# Patient Record
Sex: FEMALE | Race: WHITE | HISPANIC OR LATINO | Employment: FULL TIME | URBAN - METROPOLITAN AREA
[De-identification: names, ages, dates, MRNs, and addresses within clinical notes are randomized per-mention and may not be internally consistent; named-entity substitution may affect disease eponyms.]

---

## 2020-05-23 ENCOUNTER — TELEPHONE (OUTPATIENT)
Dept: FAMILY MEDICINE CLINIC | Facility: CLINIC | Age: 40
End: 2020-05-23

## 2020-05-23 ENCOUNTER — DOCUMENTATION (OUTPATIENT)
Dept: FAMILY MEDICINE CLINIC | Facility: CLINIC | Age: 40
End: 2020-05-23

## 2020-05-23 ENCOUNTER — TELEPHONE (OUTPATIENT)
Dept: OTHER | Facility: OTHER | Age: 40
End: 2020-05-23

## 2020-05-23 DIAGNOSIS — Z20.822 CLOSE EXPOSURE TO COVID-19 VIRUS: ICD-10-CM

## 2020-05-23 DIAGNOSIS — Z20.822 CLOSE EXPOSURE TO COVID-19 VIRUS: Primary | ICD-10-CM

## 2020-05-23 PROCEDURE — U0003 INFECTIOUS AGENT DETECTION BY NUCLEIC ACID (DNA OR RNA); SEVERE ACUTE RESPIRATORY SYNDROME CORONAVIRUS 2 (SARS-COV-2) (CORONAVIRUS DISEASE [COVID-19]), AMPLIFIED PROBE TECHNIQUE, MAKING USE OF HIGH THROUGHPUT TECHNOLOGIES AS DESCRIBED BY CMS-2020-01-R: HCPCS

## 2020-05-25 LAB — SARS-COV-2 RNA SPEC QL NAA+PROBE: DETECTED

## 2020-05-26 ENCOUNTER — TELEPHONE (OUTPATIENT)
Dept: OTHER | Facility: OTHER | Age: 40
End: 2020-05-26

## 2020-05-26 ENCOUNTER — TELEPHONE (OUTPATIENT)
Dept: FAMILY MEDICINE CLINIC | Facility: CLINIC | Age: 40
End: 2020-05-26

## 2020-05-27 ENCOUNTER — TELEMEDICINE (OUTPATIENT)
Dept: FAMILY MEDICINE CLINIC | Facility: CLINIC | Age: 40
End: 2020-05-27
Payer: COMMERCIAL

## 2020-05-27 VITALS — BODY MASS INDEX: 22.5 KG/M2 | HEIGHT: 63 IN | WEIGHT: 127 LBS

## 2020-05-27 DIAGNOSIS — U07.1 COVID-19 VIRUS INFECTION: Primary | ICD-10-CM

## 2020-05-27 PROCEDURE — 99213 OFFICE O/P EST LOW 20 MIN: CPT | Performed by: FAMILY MEDICINE

## 2020-05-27 PROCEDURE — 3008F BODY MASS INDEX DOCD: CPT | Performed by: FAMILY MEDICINE

## 2020-05-27 RX ORDER — METOPROLOL SUCCINATE 100 MG/1
100 TABLET, EXTENDED RELEASE ORAL DAILY
COMMUNITY
Start: 2020-05-16 | End: 2021-02-19

## 2020-06-05 ENCOUNTER — TELEPHONE (OUTPATIENT)
Dept: FAMILY MEDICINE CLINIC | Facility: CLINIC | Age: 40
End: 2020-06-05

## 2020-06-10 ENCOUNTER — TELEPHONE (OUTPATIENT)
Dept: FAMILY MEDICINE CLINIC | Facility: CLINIC | Age: 40
End: 2020-06-10

## 2020-06-16 ENCOUNTER — TELEPHONE (OUTPATIENT)
Dept: FAMILY MEDICINE CLINIC | Facility: CLINIC | Age: 40
End: 2020-06-16

## 2020-06-18 ENCOUNTER — TELEPHONE (OUTPATIENT)
Dept: FAMILY MEDICINE CLINIC | Facility: CLINIC | Age: 40
End: 2020-06-18

## 2020-06-19 ENCOUNTER — TELEPHONE (OUTPATIENT)
Dept: FAMILY MEDICINE CLINIC | Facility: CLINIC | Age: 40
End: 2020-06-19

## 2021-01-15 ENCOUNTER — HOSPITAL ENCOUNTER (EMERGENCY)
Facility: HOSPITAL | Age: 41
Discharge: HOME | End: 2021-01-15
Attending: EMERGENCY MEDICINE
Payer: COMMERCIAL

## 2021-01-15 VITALS
OXYGEN SATURATION: 99 % | DIASTOLIC BLOOD PRESSURE: 82 MMHG | RESPIRATION RATE: 18 BRPM | HEIGHT: 63 IN | BODY MASS INDEX: 22.32 KG/M2 | SYSTOLIC BLOOD PRESSURE: 131 MMHG | TEMPERATURE: 97.9 F | WEIGHT: 126 LBS | HEART RATE: 103 BPM

## 2021-01-15 DIAGNOSIS — K52.9 GASTROENTERITIS: Primary | ICD-10-CM

## 2021-01-15 LAB
ALBUMIN SERPL BCP-MCNC: 4.2 G/DL (ref 3.5–5)
ALP SERPL-CCNC: 73 U/L (ref 46–116)
ALT SERPL W P-5'-P-CCNC: 17 U/L (ref 12–78)
ANION GAP SERPL CALCULATED.3IONS-SCNC: 9 MMOL/L (ref 4–13)
AST SERPL W P-5'-P-CCNC: 18 U/L (ref 5–45)
BACTERIA UR QL AUTO: ABNORMAL /HPF
BASOPHILS # BLD MANUAL: 0 THOUSAND/UL (ref 0–0.1)
BASOPHILS NFR MAR MANUAL: 0 % (ref 0–1)
BILIRUB SERPL-MCNC: 1.07 MG/DL (ref 0.2–1)
BILIRUB UR QL STRIP: NEGATIVE
BUN SERPL-MCNC: 15 MG/DL (ref 5–25)
CALCIUM SERPL-MCNC: 9.7 MG/DL (ref 8.3–10.1)
CHLORIDE SERPL-SCNC: 97 MMOL/L (ref 100–108)
CLARITY UR: CLEAR
CO2 SERPL-SCNC: 28 MMOL/L (ref 21–32)
COLOR UR: YELLOW
CREAT SERPL-MCNC: 0.95 MG/DL (ref 0.6–1.3)
EOSINOPHIL # BLD MANUAL: 0 THOUSAND/UL (ref 0–0.4)
EOSINOPHIL NFR BLD MANUAL: 0 % (ref 0–6)
ERYTHROCYTE [DISTWIDTH] IN BLOOD BY AUTOMATED COUNT: 12 % (ref 11.6–15.1)
EXT PREG TEST URINE: NEGATIVE
EXT. CONTROL ED NAV: NORMAL
GFR SERPL CREATININE-BSD FRML MDRD: 76 ML/MIN/1.73SQ M
GLUCOSE SERPL-MCNC: 187 MG/DL (ref 65–140)
GLUCOSE UR STRIP-MCNC: NEGATIVE MG/DL
HCT VFR BLD AUTO: 46 % (ref 34.8–46.1)
HGB BLD-MCNC: 15.8 G/DL (ref 11.5–15.4)
HGB UR QL STRIP.AUTO: ABNORMAL
KETONES UR STRIP-MCNC: ABNORMAL MG/DL
LEUKOCYTE ESTERASE UR QL STRIP: NEGATIVE
LIPASE SERPL-CCNC: 109 U/L (ref 73–393)
LYMPHOCYTES # BLD AUTO: 0.97 THOUSAND/UL (ref 0.6–4.47)
LYMPHOCYTES # BLD AUTO: 4 % (ref 14–44)
MAGNESIUM SERPL-MCNC: 2.1 MG/DL (ref 1.6–2.6)
MCH RBC QN AUTO: 33.1 PG (ref 26.8–34.3)
MCHC RBC AUTO-ENTMCNC: 34.3 G/DL (ref 31.4–37.4)
MCV RBC AUTO: 96 FL (ref 82–98)
MONOCYTES # BLD AUTO: 0.48 THOUSAND/UL (ref 0–1.22)
MONOCYTES NFR BLD: 2 % (ref 4–12)
MUCOUS THREADS UR QL AUTO: ABNORMAL
NEUTROPHILS # BLD MANUAL: 22.71 THOUSAND/UL (ref 1.85–7.62)
NEUTS BAND NFR BLD MANUAL: 1 % (ref 0–8)
NEUTS SEG NFR BLD AUTO: 93 % (ref 43–75)
NITRITE UR QL STRIP: NEGATIVE
NON-SQ EPI CELLS URNS QL MICRO: ABNORMAL /HPF
NRBC BLD AUTO-RTO: 0 /100 WBCS
PH UR STRIP.AUTO: 7.5 [PH] (ref 4.5–8)
PLATELET # BLD AUTO: 325 THOUSANDS/UL (ref 149–390)
PLATELET BLD QL SMEAR: ADEQUATE
PMV BLD AUTO: 9.7 FL (ref 8.9–12.7)
POTASSIUM SERPL-SCNC: 3.7 MMOL/L (ref 3.5–5.3)
PROT SERPL-MCNC: 8.6 G/DL (ref 6.4–8.2)
PROT UR STRIP-MCNC: ABNORMAL MG/DL
RBC # BLD AUTO: 4.77 MILLION/UL (ref 3.81–5.12)
RBC #/AREA URNS AUTO: ABNORMAL /HPF
RBC MORPH BLD: NORMAL
SODIUM SERPL-SCNC: 134 MMOL/L (ref 136–145)
SP GR UR STRIP.AUTO: 1.02 (ref 1–1.03)
TOTAL CELLS COUNTED SPEC: 100
UROBILINOGEN UR QL STRIP.AUTO: 0.2 E.U./DL
WBC # BLD AUTO: 24.16 THOUSAND/UL (ref 4.31–10.16)
WBC #/AREA URNS AUTO: ABNORMAL /HPF

## 2021-01-15 PROCEDURE — 36415 COLL VENOUS BLD VENIPUNCTURE: CPT | Performed by: PHYSICIAN ASSISTANT

## 2021-01-15 PROCEDURE — 85007 BL SMEAR W/DIFF WBC COUNT: CPT | Performed by: PHYSICIAN ASSISTANT

## 2021-01-15 PROCEDURE — 99283 EMERGENCY DEPT VISIT LOW MDM: CPT

## 2021-01-15 PROCEDURE — 96375 TX/PRO/DX INJ NEW DRUG ADDON: CPT

## 2021-01-15 PROCEDURE — 80053 COMPREHEN METABOLIC PANEL: CPT | Performed by: PHYSICIAN ASSISTANT

## 2021-01-15 PROCEDURE — 81025 URINE PREGNANCY TEST: CPT | Performed by: PHYSICIAN ASSISTANT

## 2021-01-15 PROCEDURE — 83735 ASSAY OF MAGNESIUM: CPT | Performed by: PHYSICIAN ASSISTANT

## 2021-01-15 PROCEDURE — 96374 THER/PROPH/DIAG INJ IV PUSH: CPT

## 2021-01-15 PROCEDURE — 83690 ASSAY OF LIPASE: CPT | Performed by: PHYSICIAN ASSISTANT

## 2021-01-15 PROCEDURE — C9113 INJ PANTOPRAZOLE SODIUM, VIA: HCPCS | Performed by: PHYSICIAN ASSISTANT

## 2021-01-15 PROCEDURE — 99284 EMERGENCY DEPT VISIT MOD MDM: CPT | Performed by: PHYSICIAN ASSISTANT

## 2021-01-15 PROCEDURE — 85027 COMPLETE CBC AUTOMATED: CPT | Performed by: PHYSICIAN ASSISTANT

## 2021-01-15 PROCEDURE — 96361 HYDRATE IV INFUSION ADD-ON: CPT

## 2021-01-15 PROCEDURE — 81001 URINALYSIS AUTO W/SCOPE: CPT

## 2021-01-15 RX ORDER — METOCLOPRAMIDE HYDROCHLORIDE 5 MG/ML
10 INJECTION INTRAMUSCULAR; INTRAVENOUS ONCE
Status: COMPLETED | OUTPATIENT
Start: 2021-01-15 | End: 2021-01-15

## 2021-01-15 RX ORDER — ONDANSETRON 4 MG/1
4 TABLET, ORALLY DISINTEGRATING ORAL ONCE
Status: COMPLETED | OUTPATIENT
Start: 2021-01-15 | End: 2021-01-15

## 2021-01-15 RX ORDER — SUCRALFATE 1 G/1
1 TABLET ORAL ONCE
Status: COMPLETED | OUTPATIENT
Start: 2021-01-15 | End: 2021-01-15

## 2021-01-15 RX ORDER — DIPHENHYDRAMINE HYDROCHLORIDE 50 MG/ML
12.5 INJECTION INTRAMUSCULAR; INTRAVENOUS ONCE
Status: COMPLETED | OUTPATIENT
Start: 2021-01-15 | End: 2021-01-15

## 2021-01-15 RX ORDER — SUCRALFATE 1 G/1
1 TABLET ORAL 2 TIMES DAILY
Qty: 14 TABLET | Refills: 0 | Status: SHIPPED | OUTPATIENT
Start: 2021-01-15 | End: 2021-03-11 | Stop reason: ALTCHOICE

## 2021-01-15 RX ORDER — ONDANSETRON 4 MG/1
4 TABLET, ORALLY DISINTEGRATING ORAL EVERY 6 HOURS PRN
Qty: 12 TABLET | Refills: 0 | Status: SHIPPED | OUTPATIENT
Start: 2021-01-15 | End: 2021-03-11 | Stop reason: ALTCHOICE

## 2021-01-15 RX ORDER — PANTOPRAZOLE SODIUM 40 MG/1
40 INJECTION, POWDER, FOR SOLUTION INTRAVENOUS ONCE
Status: COMPLETED | OUTPATIENT
Start: 2021-01-15 | End: 2021-01-15

## 2021-01-15 RX ADMIN — METOCLOPRAMIDE HYDROCHLORIDE 10 MG: 5 INJECTION INTRAMUSCULAR; INTRAVENOUS at 03:41

## 2021-01-15 RX ADMIN — SODIUM CHLORIDE 1000 ML: 0.9 INJECTION, SOLUTION INTRAVENOUS at 02:19

## 2021-01-15 RX ADMIN — PANTOPRAZOLE SODIUM 40 MG: 40 INJECTION, POWDER, FOR SOLUTION INTRAVENOUS at 02:26

## 2021-01-15 RX ADMIN — ONDANSETRON 4 MG: 4 TABLET, ORALLY DISINTEGRATING ORAL at 02:26

## 2021-01-15 RX ADMIN — SUCRALFATE 1 G: 1 TABLET ORAL at 03:41

## 2021-01-15 RX ADMIN — HYOSCYAMINE SULFATE 0.12 MG: 0.12 TABLET SUBLINGUAL at 02:26

## 2021-01-15 RX ADMIN — DIPHENHYDRAMINE HYDROCHLORIDE 12.5 MG: 50 INJECTION, SOLUTION INTRAMUSCULAR; INTRAVENOUS at 03:41

## 2021-01-15 NOTE — Clinical Note
Ericakaushik Lorenzo was seen and treated in our emergency department on 1/15/2021  Diagnosis:     [place]    She may return on this date: 01/17/2021         If you have any questions or concerns, please don't hesitate to call        Chikis Gonzalez PA-C    ______________________________           _______________          _______________  Hospital Representative                              Date                                Time

## 2021-01-19 NOTE — ED PROVIDER NOTES
EMERGENCY MEDICINE NOTE        PATIENT IDENTIFICATION PHYSICIAN/SERVICE INFORMATION   Name: Ayana John  MRN: [id number]  YOB: 1980  Age/Sex: 45 y o  female  Preferred Language: Swedish  Code Status: No Order  Encounter Date: 1/15/2021  Attending Physician: Kang Melissa MD  Admitting Physician: No admitting provider for patient encounter  Primary Care Physician: Gertrude Triplett MD         Primary Care Phone: [phone]     Chief Complaint   Patient presents with    Vomiting     Pt comes to ED for n/v/d  States the n/v started last night and the diarrhea started today            HISTORY OF PRESENT ILLNESS       History provided by:  Patient   used: No    Vomiting  Severity:  Mild  Duration:  1 day  Timing:  Intermittent  Number of daily episodes:  2  Quality:  Stomach contents  Able to tolerate:  Liquids  Progression:  Unchanged  Chronicity:  New  Recent urination:  Normal  Context: not post-tussive and not self-induced    Relieved by:  Nothing  Worsened by:  Nothing  Ineffective treatments:  None tried  Associated symptoms: abdominal pain, diarrhea and myalgias    Associated symptoms: no arthralgias, no chills, no cough, no fever, no headaches, no sore throat and no URI    Abdominal pain:     Location:  Generalized    Quality: burning and cramping      Severity:  Mild    Onset quality:  Gradual    Duration:  1 day    Timing:  Intermittent    Progression:  Waxing and waning    Chronicity:  New  Risk factors: no alcohol use, no diabetes, not pregnant, no prior abdominal surgery, no sick contacts, no suspect food intake and no travel to endemic areas          PAST MEDICAL AND SURGICAL HISTORY     Past Medical History:   Diagnosis Date    Hypertension        Past Surgical History:   Procedure Laterality Date    KNEE SURGERY         Family History   Problem Relation Age of Onset    Heart disease Father     Diabetes Father     Stomach cancer Brother E-Cigarette/Vaping    E-Cigarette Use Never User      E-Cigarette/Vaping Substances     Social History     Tobacco Use    Smoking status: Never Smoker    Smokeless tobacco: Never Used   Substance Use Topics    Alcohol use: Never     Frequency: Never     Comment: none    Drug use: Never         ALLERGIES     No Known Allergies      HOME MEDICATIONS     Prior to Admission Medications   Prescriptions Last Dose Informant Patient Reported? Taking?   metoprolol succinate (TOPROL-XL) 100 mg 24 hr tablet 1/14/2021 at Unknown time  Yes Yes   Sig: Take 100 mg by mouth daily      Facility-Administered Medications: None         REVIEW OF SYSTEMS     Review of Systems   Constitutional: Positive for activity change, appetite change and fatigue  Negative for chills, diaphoresis and fever  HENT: Negative for congestion, drooling, ear discharge, ear pain, facial swelling, postnasal drip, rhinorrhea, sinus pressure, sinus pain, sore throat, trouble swallowing and voice change  Eyes: Negative for discharge  Respiratory: Negative for apnea, cough, choking, chest tightness, shortness of breath, wheezing and stridor  Cardiovascular: Negative for chest pain  Gastrointestinal: Positive for abdominal pain, diarrhea and vomiting  Negative for abdominal distention, constipation and nausea  Genitourinary: Negative for decreased urine volume, difficulty urinating, dysuria, flank pain, frequency, genital sores, hematuria and urgency  Musculoskeletal: Positive for myalgias  Negative for arthralgias and joint swelling  Skin: Negative for rash and wound  Neurological: Negative for dizziness, light-headedness and headaches  Hematological: Negative for adenopathy  Psychiatric/Behavioral: The patient is not nervous/anxious  All other systems reviewed and are negative          PHYSICAL EXAMINATION     ED Triage Vitals [01/15/21 0155]   Temperature Pulse Respirations Blood Pressure SpO2   97 9 °F (36 6 °C) 103 18 131/82 99 %      Temp Source Heart Rate Source Patient Position - Orthostatic VS BP Location FiO2 (%)   Oral Monitor Sitting Right arm --      Pain Score       5         Wt Readings from Last 3 Encounters:   01/15/21 57 2 kg (126 lb)   05/27/20 57 6 kg (127 lb)         Physical Exam  Vitals signs and nursing note reviewed  Constitutional:       General: She is not in acute distress  Appearance: She is well-developed  She is ill-appearing  She is not toxic-appearing or diaphoretic  HENT:      Head: Normocephalic and atraumatic  Jaw: No trismus  Right Ear: Hearing, tympanic membrane, ear canal and external ear normal  No decreased hearing noted  No laceration, drainage, swelling or tenderness  No middle ear effusion  No foreign body  No mastoid tenderness  No hemotympanum  Tympanic membrane is not injected, scarred, perforated, erythematous, retracted or bulging  Left Ear: Hearing, tympanic membrane, ear canal and external ear normal  No decreased hearing noted  No laceration, drainage, swelling or tenderness  No middle ear effusion  No foreign body  No mastoid tenderness  No hemotympanum  Tympanic membrane is not injected, scarred, perforated, erythematous, retracted or bulging  Nose: Nose normal       Right Sinus: No maxillary sinus tenderness or frontal sinus tenderness  Left Sinus: No maxillary sinus tenderness or frontal sinus tenderness  Mouth/Throat:      Mouth: Mucous membranes are moist       Pharynx: Uvula midline  No oropharyngeal exudate, posterior oropharyngeal erythema or uvula swelling  Tonsils: No tonsillar exudate or tonsillar abscesses  1+ on the right  1+ on the left  Eyes:      General:         Right eye: No discharge  Left eye: No discharge  Conjunctiva/sclera: Conjunctivae normal       Pupils: Pupils are equal, round, and reactive to light  Neck:      Musculoskeletal: Normal range of motion and neck supple     Cardiovascular:      Rate and Rhythm: Normal rate and regular rhythm  Heart sounds: Normal heart sounds  No murmur  No friction rub  No gallop  Pulmonary:      Effort: Pulmonary effort is normal  No respiratory distress  Breath sounds: Normal breath sounds  No stridor  No wheezing, rhonchi or rales  Chest:      Chest wall: No tenderness  Abdominal:      General: Bowel sounds are normal  There is no distension  Palpations: Abdomen is soft  Abdomen is not rigid  There is no mass  Tenderness: There is no abdominal tenderness  There is no right CVA tenderness, left CVA tenderness, guarding or rebound  Negative signs include Smith's sign and McBurney's sign  Hernia: No hernia is present  Comments: Negative Smith's  Negative Appendiceal signs (Psoas, Rovsing's, Obturator)  Negative Peritoneal Signs   Musculoskeletal: Normal range of motion  Right lower leg: No edema  Left lower leg: No edema  Lymphadenopathy:      Cervical: No cervical adenopathy  Skin:     General: Skin is warm and dry  Capillary Refill: Capillary refill takes less than 2 seconds  Findings: No rash  Neurological:      Mental Status: She is alert and oriented to person, place, and time  DIAGNOSTIC RESULTS     Laboratory results:    Labs Reviewed   CBC AND DIFFERENTIAL - Abnormal       Result Value Ref Range Status    WBC 24 16 (*) 4 31 - 10 16 Thousand/uL Final    RBC 4 77  3 81 - 5 12 Million/uL Final    Hemoglobin 15 8 (*) 11 5 - 15 4 g/dL Final    Hematocrit 46 0  34 8 - 46 1 % Final    MCV 96  82 - 98 fL Final    MCH 33 1  26 8 - 34 3 pg Final    MCHC 34 3  31 4 - 37 4 g/dL Final    RDW 12 0  11 6 - 15 1 % Final    MPV 9 7  8 9 - 12 7 fL Final    Platelets 646  284 - 390 Thousands/uL Final    nRBC 0  /100 WBCs Final    Comment: This is an appended report  These results have been appended to a previously preliminary verified report  Narrative: This is an appended report    These results have been appended to a previously verified report  COMPREHENSIVE METABOLIC PANEL - Abnormal    Sodium 134 (*) 136 - 145 mmol/L Final    Potassium 3 7  3 5 - 5 3 mmol/L Final    Chloride 97 (*) 100 - 108 mmol/L Final    CO2 28  21 - 32 mmol/L Final    ANION GAP 9  4 - 13 mmol/L Final    BUN 15  5 - 25 mg/dL Final    Creatinine 0 95  0 60 - 1 30 mg/dL Final    Comment: Standardized to IDMS reference method    Glucose 187 (*) 65 - 140 mg/dL Final    Comment: If the patient is fasting, the ADA then defines impaired fasting glucose as > 100 mg/dL and diabetes as > or equal to 123 mg/dL  Specimen collection should occur prior to Sulfasalazine administration due to the potential for falsely depressed results  Specimen collection should occur prior to Sulfapyridine administration due to the potential for falsely elevated results  Calcium 9 7  8 3 - 10 1 mg/dL Final    AST 18  5 - 45 U/L Final    Comment: Specimen collection should occur prior to Sulfasalazine administration due to the potential for falsely depressed results  ALT 17  12 - 78 U/L Final    Comment: Specimen collection should occur prior to Sulfasalazine administration due to the potential for falsely depressed results  Alkaline Phosphatase 73  46 - 116 U/L Final    Total Protein 8 6 (*) 6 4 - 8 2 g/dL Final    Albumin 4 2  3 5 - 5 0 g/dL Final    Total Bilirubin 1 07 (*) 0 20 - 1 00 mg/dL Final    Comment: Use of this assay is not recommended for patients undergoing treatment with eltrombopag due to the potential for falsely elevated results      eGFR 76  ml/min/1 73sq m Final    Narrative:     [place] guidelines for Chronic Kidney Disease (CKD):     Stage 1 with normal or high GFR (GFR > 90 mL/min/1 73 square meters)    Stage 2 Mild CKD (GFR = 60-89 mL/min/1 73 square meters)    Stage 3A Moderate CKD (GFR = 45-59 mL/min/1 73 square meters)    Stage 3B Moderate CKD (GFR = 30-44 mL/min/1 73 square meters)    Stage 4 Severe CKD (GFR = 15-29 mL/min/1 73 square meters)    Stage 5 End Stage CKD (GFR <15 mL/min/1 73 square meters)  Note: GFR calculation is accurate only with a steady state creatinine   URINE MICROSCOPIC - Abnormal    RBC, UA 1-2  None Seen, 0-1, 1-2, 2-4, 0-5 /hpf Final    WBC, UA 0-1  None Seen, 0-1, 1-2, 0-5, 2-4 /hpf Final    Epithelial Cells Occasional  None Seen, Occasional /hpf Final    Bacteria, UA Occasional  None Seen, Occasional /hpf Final    MUCUS THREADS Occasional (*) None Seen Final   URINE MACROSCOPIC, POC - Abnormal    Color, UA Yellow   Final    Clarity, UA Clear   Final    pH, UA 7 5  4 5 - 8 0 Final    Leukocytes, UA Negative  Negative Final    Nitrite, UA Negative  Negative Final    Protein, UA 30 (1+) (*) Negative mg/dl Final    Glucose, UA Negative  Negative mg/dl Final    Ketones, UA 40 (2+) (*) Negative mg/dl Final    Urobilinogen, UA 0 2  0 2, 1 0 E U /dl E U /dl Final    Bilirubin, UA Negative  Negative Final    Blood, UA Moderate (*) Negative Final    Specific Gravity, UA 1 020  1 003 - 1 030 Final    Narrative:     CLINITEK RESULT   MANUAL DIFFERENTIAL(PHLEBS DO NOT ORDER) - Abnormal    Segmented % 93 (*) 43 - 75 % Final    Bands % 1  0 - 8 % Final    Lymphocytes % 4 (*) 14 - 44 % Final    Monocytes % 2 (*) 4 - 12 % Final    Eosinophils, % 0  0 - 6 % Final    Basophils % 0  0 - 1 % Final    Absolute Neutrophils 22 71 (*) 1 85 - 7 62 Thousand/uL Final    Lymphocytes Absolute 0 97  0 60 - 4 47 Thousand/uL Final    Monocytes Absolute 0 48  0 00 - 1 22 Thousand/uL Final    Eosinophils Absolute 0 00  0 00 - 0 40 Thousand/uL Final    Basophils Absolute 0 00  0 00 - 0 10 Thousand/uL Final    Total Counted 100   Final    RBC Morphology Normal   Final    Platelet Estimate Adequate  Adequate Final   LIPASE - Normal    Lipase 109  73 - 393 u/L Final   MAGNESIUM - Normal    Magnesium 2 1  1 6 - 2 6 mg/dL Final   POCT PREGNANCY, URINE - Normal    EXT PREG TEST UR (Ref: Negative) negative   Final    Control valid   Final       All labs reviewed and utilized in the medical decision making process    Radiology results:    No orders to display       All radiology studies independently viewed by me and interpreted by the radiologist       PROCEDURES     Procedures      Invasive Devices     None                 ASSESSMENT AND PLAN     MDM  Number of Diagnoses or Management Options  Gastroenteritis: new, needed workup     Amount and/or Complexity of Data Reviewed  Clinical lab tests: ordered and reviewed  Tests in the medicine section of CPT®: ordered and reviewed  Review and summarize past medical records: yes    Risk of Complications, Morbidity, and/or Mortality  Presenting problems: moderate  Diagnostic procedures: moderate  Management options: moderate    Patient Progress  Patient progress: improved      Initial ED assessment:  Shakir Garay is a 45 y o  female with no significant PMH who presents with n/v/d, abdominal pain  Vitals signs reviewed and WNL  Physical examination is unremarkable    Initial Ddx  includes but is not limited to:   gastroenteritis, food poisoning, mesenteric adenitis, appendicitis, IBD, IBS, ileus, bowel obstruction, toxic megacolon, colitis, enteritis, gastritis, PUD, GERD, hepatitis, pancreatitis, cholecystitis, biliary colic, choledocholithiasis, doubt splenic etiology; renal colic, pyelonephritis, UTI  Initial ED plan:   Plan will be to perform diagnostic testing of Blood labs and Urinalysis and treat symptomatically   Final ED summary/disposition: Discussed results of diagnostic testing with Patient in detail  Greater than 10 minutes of education regarding diagnosis, testing, and ample opportunity for questions  Home care recommendations given with discharge paperwork  Return to ED instructions given if new/worsening sxs  Verbalized understanding      MDM  Reviewed: previous chart, nursing note and vitals  Interpretation: labs          ED COURSE OF CARE AND REASSESSMENT US AUDIT      Most Recent Value   Initial Alcohol Screen: US AUDIT-C    1  How often do you have a drink containing alcohol? 1 Filed at: 01/15/2021 0204   2  How many drinks containing alcohol do you have on a typical day you are drinking? 1 Filed at: 01/15/2021 0204   3a  Male UNDER 65: How often do you have five or more drinks on one occasion? 0 Filed at: 01/15/2021 0204   3b  FEMALE Any Age, or MALE 65+: How often do you have 4 or more drinks on one occassion? 0 Filed at: 01/15/2021 0204   Audit-C Score  2 Filed at: 01/15/2021 3881                  PO/DAST-10      Most Recent Value   PO: How many times in the past year have you    Used an illegal drug or used a prescription medication for non-medical reasons? Never Filed at: 01/15/2021 0204                          Medications   ondansetron (ZOFRAN-ODT) dispersible tablet 4 mg (4 mg Oral Given 1/15/21 0226)   sodium chloride 0 9 % bolus 1,000 mL (0 mL Intravenous Stopped 1/15/21 0344)   pantoprazole (PROTONIX) injection 40 mg (40 mg Intravenous Given 1/15/21 0226)   hyoscyamine (LEVSIN/SL) SL tablet 0 125 mg (0 125 mg Sublingual Given 1/15/21 0226)   diphenhydrAMINE (BENADRYL) injection 12 5 mg (12 5 mg Intravenous Given 1/15/21 0341)   metoclopramide (REGLAN) injection 10 mg (10 mg Intravenous Given 1/15/21 0341)   sucralfate (CARAFATE) tablet 1 g (1 g Oral Given 1/15/21 0341)         FINAL IMPRESSION     Final diagnoses:   Gastroenteritis         DISPOSITION AND PLANNING     Time reflects when diagnosis was documented in both MDM as applicable and the Disposition within this note     Time User Action Codes Description Comment    1/15/2021  3:33 AM King Hinkle Add [K52 9] Gastroenteritis       ED Disposition     ED Disposition Condition Date/Time Comment    Discharge Stable Fri Riccardo 15, 2021  3:33 AM Danni Cones discharge to home/self care              Follow-up Information     Follow up With Specialties Details Why Contact Info Additional Information    Jeri Prater MD Family Medicine Call in 1 day For follow up [address]  [facility] Emergency Medicine Go to  If symptoms worsen [address] [id number] [facility], [address], [place], [address], [address] [place], [place]  [address]  [address] [facility] [address] [facility]  [phone]    Call in 1 day  For follow up    [address] [facility] [address]  [phone]  Go to   If symptoms worsen        DISCHARGE MEDICATIONS     Discharge Medication List as of 1/15/2021  3:35 AM      START taking these medications    Details   hyoscyamine (LEVSIN/SL) 0 125 mg SL tablet Take 1 tablet (0 125 mg total) by mouth every 6 (six) hours as needed for cramping or diarrhea, Starting Fri 1/15/2021, Normal      ondansetron (ZOFRAN-ODT) 4 mg disintegrating tablet Take 1 tablet (4 mg total) by mouth every 6 (six) hours as needed for nausea or vomiting, Starting Fri 1/15/2021, Normal      sucralfate (CARAFATE) 1 g tablet Take 1 tablet (1 g total) by mouth 2 (two) times a day, Starting Fri 1/15/2021, Normal         CONTINUE these medications which have NOT CHANGED    Details   metoprolol succinate (TOPROL-XL) 100 mg 24 hr tablet Take 100 mg by mouth daily, Starting Sat 5/16/2020, Historical Med             No discharge procedures on file      PDMP Review     None          LATANYA Doshi PA-C  01/19/21 0971

## 2021-02-19 DIAGNOSIS — I10 ESSENTIAL HYPERTENSION: Primary | ICD-10-CM

## 2021-02-19 RX ORDER — METOPROLOL SUCCINATE 100 MG/1
TABLET, EXTENDED RELEASE ORAL
Qty: 90 TABLET | Refills: 3 | Status: SHIPPED | OUTPATIENT
Start: 2021-02-19 | End: 2021-03-11 | Stop reason: SDUPTHER

## 2021-03-06 PROBLEM — I10 ESSENTIAL HYPERTENSION: Status: ACTIVE | Noted: 2021-03-06

## 2021-03-06 PROBLEM — R73.09 ABNORMAL GLUCOSE: Status: ACTIVE | Noted: 2021-03-06

## 2021-03-06 PROBLEM — K21.9 GASTROESOPHAGEAL REFLUX DISEASE WITHOUT ESOPHAGITIS: Status: ACTIVE | Noted: 2021-03-06

## 2021-03-06 NOTE — PROGRESS NOTES
Assessment/Plan:         Problem List Items Addressed This Visit        Digestive    Gastroesophageal reflux disease without esophagitis     Pt does well on sucralfate wants refill         Relevant Medications    sucralfate (CARAFATE) 1 g tablet       Cardiovascular and Mediastinum    Essential hypertension    Relevant Medications    metoprolol succinate (TOPROL-XL) 100 mg 24 hr tablet    Rheumatic mitral stenosis     Needs to see her cardiologist dr Gray Novoa         Relevant Medications    metoprolol succinate (TOPROL-XL) 100 mg 24 hr tablet       Other    Abnormal glucose - Primary     hga1c 5 1           Relevant Orders    POCT hemoglobin A1c (Completed)    POCT hemoglobin A1c (Completed)    Bandemia     Had gastroenteritis in [place] will recheck         Relevant Orders    CBC and differential      Other Visit Diagnoses     Immunization due        Relevant Orders    TDAP VACCINE GREATER THAN OR EQUAL TO 6YO IM (Completed)    Screening cholesterol level        Relevant Orders    Lipid panel            Subjective:  Pt here for interval visit HTN GERD pt had abnormal glucose 1/21 needs HgA1c also wbc 24k in [place] pt had gastroenteritis     Patient ID: Son Wells is a 45 y o  female  HPI    The following portions of the patient's history were reviewed and updated as appropriate:   Past Medical History:  She has a past medical history of Hypertension and Mitral stenosis  ,  _______________________________________________________________________  Medical Problems:  does not have any pertinent problems on file ,  _______________________________________________________________________  Past Surgical History:   has a past surgical history that includes Knee surgery  ,  _______________________________________________________________________  Family History:  family history includes Diabetes in her father; Heart disease in her father; Hypertension in her mother; Stomach cancer in her brother ,  _______________________________________________________________________  Social History:   reports that she has never smoked  She has never used smokeless tobacco  She reports that she does not drink alcohol or use drugs  ,  _______________________________________________________________________  Allergies:  has No Known Allergies     _______________________________________________________________________  Current Outpatient Medications   Medication Sig Dispense Refill    metoprolol succinate (TOPROL-XL) 100 mg 24 hr tablet Take 1 tablet (100 mg total) by mouth daily 90 tablet 3    hyoscyamine (LEVSIN/SL) 0 125 mg SL tablet Take 1 tablet (0 125 mg total) by mouth every 6 (six) hours as needed for cramping or diarrhea (Patient not taking: Reported on 3/11/2021) 12 tablet 0    sucralfate (CARAFATE) 1 g tablet Take 1 tablet (1 g total) by mouth 4 (four) times a day 120 tablet 3     No current facility-administered medications for this visit       _______________________________________________________________________  Review of Systems   Constitutional: Negative for appetite change, chills, fatigue and fever  Respiratory: Negative for cough, chest tightness and shortness of breath  Cardiovascular: Negative for chest pain, palpitations and leg swelling  Gastrointestinal: Negative for abdominal pain, constipation, diarrhea, nausea and vomiting  Genitourinary: Negative for difficulty urinating and frequency  Musculoskeletal: Negative for arthralgias, back pain and neck pain  Skin: Negative for rash  Neurological: Negative for dizziness, weakness, light-headedness, numbness and headaches  Hematological: Does not bruise/bleed easily  Psychiatric/Behavioral: Negative for dysphoric mood and sleep disturbance  The patient is not nervous/anxious            Objective:  Vitals:    03/11/21 1457   BP: 112/72   BP Location: Left arm   Patient Position: Sitting   Pulse: 76   Resp: 16   Temp: 98 8 °F (37 1 °C)   SpO2: 99%   Weight: 59 kg (130 lb)   Height: 5' 3" (1 6 m)     Body mass index is 23 03 kg/m²  Physical Exam  Vitals signs reviewed  Constitutional:       General: She is not in acute distress  Appearance: Normal appearance  She is well-developed  She is not ill-appearing  HENT:      Mouth/Throat:      Mouth: Mucous membranes are moist    Eyes:      Extraocular Movements: Extraocular movements intact  Conjunctiva/sclera: Conjunctivae normal       Pupils: Pupils are equal, round, and reactive to light  Neck:      Musculoskeletal: Normal range of motion and neck supple  Thyroid: No thyromegaly  Vascular: No carotid bruit  Cardiovascular:      Rate and Rhythm: Normal rate and regular rhythm  Pulses: Normal pulses  Heart sounds: Murmur (2/6 systolic) present  Pulmonary:      Effort: Pulmonary effort is normal  No respiratory distress  Breath sounds: Normal breath sounds  Chest:      Chest wall: No tenderness  Abdominal:      General: Bowel sounds are normal  There is no distension  Palpations: Abdomen is soft  Tenderness: There is no abdominal tenderness  Lymphadenopathy:      Cervical: No cervical adenopathy  Skin:     General: Skin is warm and dry  Neurological:      General: No focal deficit present  Mental Status: She is alert and oriented to person, place, and time  Mental status is at baseline  Cranial Nerves: No cranial nerve deficit        Deep Tendon Reflexes: Reflexes normal    Psychiatric:         Mood and Affect: Mood normal          Behavior: Behavior normal

## 2021-03-11 ENCOUNTER — OFFICE VISIT (OUTPATIENT)
Dept: FAMILY MEDICINE CLINIC | Facility: CLINIC | Age: 41
End: 2021-03-11
Payer: COMMERCIAL

## 2021-03-11 VITALS
TEMPERATURE: 98.8 F | HEART RATE: 76 BPM | SYSTOLIC BLOOD PRESSURE: 112 MMHG | BODY MASS INDEX: 23.04 KG/M2 | HEIGHT: 63 IN | RESPIRATION RATE: 16 BRPM | WEIGHT: 130 LBS | DIASTOLIC BLOOD PRESSURE: 72 MMHG | OXYGEN SATURATION: 99 %

## 2021-03-11 DIAGNOSIS — Z23 IMMUNIZATION DUE: ICD-10-CM

## 2021-03-11 DIAGNOSIS — R73.09 ABNORMAL GLUCOSE: Primary | ICD-10-CM

## 2021-03-11 DIAGNOSIS — D72.825 BANDEMIA: ICD-10-CM

## 2021-03-11 DIAGNOSIS — I10 ESSENTIAL HYPERTENSION: ICD-10-CM

## 2021-03-11 DIAGNOSIS — Z13.220 SCREENING CHOLESTEROL LEVEL: ICD-10-CM

## 2021-03-11 DIAGNOSIS — K21.9 GASTROESOPHAGEAL REFLUX DISEASE WITHOUT ESOPHAGITIS: ICD-10-CM

## 2021-03-11 DIAGNOSIS — I05.0 RHEUMATIC MITRAL STENOSIS: ICD-10-CM

## 2021-03-11 LAB
SL AMB POCT HEMOGLOBIN AIC: 5.1 (ref ?–6.5)
SL AMB POCT HEMOGLOBIN AIC: 5.1 (ref ?–6.5)

## 2021-03-11 PROCEDURE — 3008F BODY MASS INDEX DOCD: CPT | Performed by: FAMILY MEDICINE

## 2021-03-11 PROCEDURE — 83036 HEMOGLOBIN GLYCOSYLATED A1C: CPT | Performed by: FAMILY MEDICINE

## 2021-03-11 PROCEDURE — 3725F SCREEN DEPRESSION PERFORMED: CPT | Performed by: FAMILY MEDICINE

## 2021-03-11 PROCEDURE — 1036F TOBACCO NON-USER: CPT | Performed by: FAMILY MEDICINE

## 2021-03-11 PROCEDURE — 90715 TDAP VACCINE 7 YRS/> IM: CPT | Performed by: FAMILY MEDICINE

## 2021-03-11 PROCEDURE — NC001 PR NO CHARGE: Performed by: FAMILY MEDICINE

## 2021-03-11 PROCEDURE — 99214 OFFICE O/P EST MOD 30 MIN: CPT | Performed by: FAMILY MEDICINE

## 2021-03-11 PROCEDURE — 90471 IMMUNIZATION ADMIN: CPT | Performed by: FAMILY MEDICINE

## 2021-03-11 RX ORDER — SUCRALFATE 1 G/1
1 TABLET ORAL 4 TIMES DAILY
Qty: 120 TABLET | Refills: 3 | Status: SHIPPED | OUTPATIENT
Start: 2021-03-11 | End: 2021-03-11 | Stop reason: SDUPTHER

## 2021-03-11 RX ORDER — METOPROLOL SUCCINATE 100 MG/1
100 TABLET, EXTENDED RELEASE ORAL DAILY
Qty: 90 TABLET | Refills: 3 | Status: SHIPPED | OUTPATIENT
Start: 2021-03-11 | End: 2022-02-09 | Stop reason: SDUPTHER

## 2021-03-11 RX ORDER — METOPROLOL SUCCINATE 100 MG/1
100 TABLET, EXTENDED RELEASE ORAL DAILY
Qty: 90 TABLET | Refills: 3 | Status: SHIPPED | OUTPATIENT
Start: 2021-03-11 | End: 2021-03-11 | Stop reason: SDUPTHER

## 2021-03-11 RX ORDER — SUCRALFATE 1 G/1
1 TABLET ORAL 4 TIMES DAILY
Qty: 120 TABLET | Refills: 3 | Status: SHIPPED | OUTPATIENT
Start: 2021-03-11 | End: 2021-06-15 | Stop reason: SDUPTHER

## 2021-03-12 PROBLEM — I05.0 RHEUMATIC MITRAL STENOSIS: Status: ACTIVE | Noted: 2021-03-12

## 2021-03-19 ENCOUNTER — LAB (OUTPATIENT)
Dept: LAB | Facility: HOSPITAL | Age: 41
End: 2021-03-19
Attending: FAMILY MEDICINE
Payer: COMMERCIAL

## 2021-03-19 DIAGNOSIS — D72.825 BANDEMIA: ICD-10-CM

## 2021-03-19 DIAGNOSIS — Z13.220 SCREENING CHOLESTEROL LEVEL: ICD-10-CM

## 2021-03-19 LAB
BASOPHILS # BLD AUTO: 0.03 THOUSANDS/ΜL (ref 0–0.1)
BASOPHILS NFR BLD AUTO: 1 % (ref 0–1)
CHOLEST SERPL-MCNC: 249 MG/DL
EOSINOPHIL # BLD AUTO: 0.07 THOUSAND/ΜL (ref 0–0.61)
EOSINOPHIL NFR BLD AUTO: 1 % (ref 0–6)
ERYTHROCYTE [DISTWIDTH] IN BLOOD BY AUTOMATED COUNT: 11.9 % (ref 11.6–15.1)
HCT VFR BLD AUTO: 41.9 % (ref 34.8–46.1)
HDLC SERPL-MCNC: 48 MG/DL
HGB BLD-MCNC: 14.1 G/DL (ref 11.5–15.4)
IMM GRANULOCYTES # BLD AUTO: 0 THOUSAND/UL (ref 0–0.2)
IMM GRANULOCYTES NFR BLD AUTO: 0 % (ref 0–2)
LDLC SERPL CALC-MCNC: 166 MG/DL (ref 0–100)
LYMPHOCYTES # BLD AUTO: 1.42 THOUSANDS/ΜL (ref 0.6–4.47)
LYMPHOCYTES NFR BLD AUTO: 25 % (ref 14–44)
MCH RBC QN AUTO: 32.6 PG (ref 26.8–34.3)
MCHC RBC AUTO-ENTMCNC: 33.7 G/DL (ref 31.4–37.4)
MCV RBC AUTO: 97 FL (ref 82–98)
MONOCYTES # BLD AUTO: 0.33 THOUSAND/ΜL (ref 0.17–1.22)
MONOCYTES NFR BLD AUTO: 6 % (ref 4–12)
NEUTROPHILS # BLD AUTO: 3.82 THOUSANDS/ΜL (ref 1.85–7.62)
NEUTS SEG NFR BLD AUTO: 67 % (ref 43–75)
NONHDLC SERPL-MCNC: 201 MG/DL
PLATELET # BLD AUTO: 335 THOUSANDS/UL (ref 149–390)
PMV BLD AUTO: 9.6 FL (ref 8.9–12.7)
RBC # BLD AUTO: 4.32 MILLION/UL (ref 3.81–5.12)
TRIGL SERPL-MCNC: 177.2 MG/DL
WBC # BLD AUTO: 5.67 THOUSAND/UL (ref 4.31–10.16)

## 2021-03-19 PROCEDURE — 85025 COMPLETE CBC W/AUTO DIFF WBC: CPT

## 2021-03-19 PROCEDURE — 80061 LIPID PANEL: CPT

## 2021-03-19 PROCEDURE — 36415 COLL VENOUS BLD VENIPUNCTURE: CPT

## 2021-04-30 DIAGNOSIS — E78.00 HYPERCHOLESTEREMIA: Primary | ICD-10-CM

## 2021-06-09 ENCOUNTER — APPOINTMENT (OUTPATIENT)
Dept: LAB | Facility: HOSPITAL | Age: 41
End: 2021-06-09
Attending: FAMILY MEDICINE
Payer: COMMERCIAL

## 2021-06-09 DIAGNOSIS — E78.00 HYPERCHOLESTEREMIA: ICD-10-CM

## 2021-06-09 PROBLEM — Z00.00 ANNUAL PHYSICAL EXAM: Status: ACTIVE | Noted: 2021-06-09

## 2021-06-09 LAB
CHOLEST SERPL-MCNC: 220 MG/DL
HDLC SERPL-MCNC: 45 MG/DL
LDLC SERPL CALC-MCNC: 114 MG/DL (ref 0–100)
NONHDLC SERPL-MCNC: 175 MG/DL
TRIGL SERPL-MCNC: 305.3 MG/DL

## 2021-06-09 PROCEDURE — 80061 LIPID PANEL: CPT

## 2021-06-09 PROCEDURE — 36415 COLL VENOUS BLD VENIPUNCTURE: CPT

## 2021-06-09 NOTE — PROGRESS NOTES
Assessment/Plan:         Problem List Items Addressed This Visit        Digestive    Gastroesophageal reflux disease without esophagitis - Primary     Uses sucralfate prn         Relevant Medications    sucralfate (CARAFATE) 1 g tablet       Cardiovascular and Mediastinum    Essential hypertension     Well controlled         Relevant Orders    Comprehensive metabolic panel    Rheumatic mitral stenosis     Pt will call her cardiologist dr Yani Calzada  Due for echo            Other    Annual physical exam    Dyslipidemia     Chol is much better and now triglycerides are high repeat 1 month fasting and diet guidelines given         Relevant Orders    Lipid panel    RESOLVED: Abnormal glucose     Check hga1c                 Subjective: pt here for annual physical and interval visit htn hl abnormal glucose in past but hga1c 5 1 has high troglycerides     Patient ID: Tank Alvarez is a 45 y o  female  HPI    The following portions of the patient's history were reviewed and updated as appropriate:   Past Medical History:  She has a past medical history of Hypertension and Mitral stenosis  ,  _______________________________________________________________________  Medical Problems:  does not have any pertinent problems on file ,  _______________________________________________________________________  Past Surgical History:   has a past surgical history that includes Knee surgery  ,  _______________________________________________________________________  Family History:  family history includes Diabetes in her father; Heart disease in her father; Hypertension in her mother; Stomach cancer in her brother ,  _______________________________________________________________________  Social History:   reports that she has never smoked   She has never used smokeless tobacco  She reports that she does not drink alcohol and does not use drugs ,  _______________________________________________________________________  Allergies:  has No Known Allergies     _______________________________________________________________________  Current Outpatient Medications   Medication Sig Dispense Refill    metoprolol succinate (TOPROL-XL) 100 mg 24 hr tablet Take 1 tablet (100 mg total) by mouth daily 90 tablet 3    sucralfate (CARAFATE) 1 g tablet Take 1 g by mouth 4 (four) times a day       No current facility-administered medications for this visit      _______________________________________________________________________  Review of Systems   Constitutional: Negative for appetite change, chills, fatigue and fever  Respiratory: Negative for cough, chest tightness and shortness of breath  Cardiovascular: Negative for chest pain, palpitations and leg swelling  Gastrointestinal: Negative for abdominal pain, constipation, diarrhea, nausea and vomiting  Genitourinary: Negative for difficulty urinating and frequency  Musculoskeletal: Negative for arthralgias, back pain and neck pain  Skin: Negative for rash  Neurological: Negative for dizziness, weakness, light-headedness, numbness and headaches  Hematological: Does not bruise/bleed easily  Psychiatric/Behavioral: Negative for dysphoric mood and sleep disturbance  The patient is not nervous/anxious  Objective:  Vitals:    06/15/21 0952   BP: 110/70   BP Location: Left arm   Patient Position: Sitting   Cuff Size: Adult   Pulse: (!) 51   Resp: 16   Temp: (!) 97 2 °F (36 2 °C)   TempSrc: Temporal   SpO2: 99%   Weight: 60 2 kg (132 lb 11 2 oz)   Height: 5' 3" (1 6 m)     Body mass index is 23 51 kg/m²  Physical Exam  Vitals reviewed  Constitutional:       General: She is not in acute distress  Appearance: Normal appearance  She is well-developed  HENT:      Head: Normocephalic        Right Ear: Tympanic membrane, ear canal and external ear normal       Left Ear: Tympanic membrane, ear canal and external ear normal       Nose: Nose normal       Mouth/Throat:      Pharynx: No oropharyngeal exudate  Eyes:      General: Lids are normal       Extraocular Movements: Extraocular movements intact  Conjunctiva/sclera: Conjunctivae normal       Pupils: Pupils are equal, round, and reactive to light  Neck:      Thyroid: No thyromegaly  Vascular: No carotid bruit  Cardiovascular:      Rate and Rhythm: Normal rate and regular rhythm  Pulses: Normal pulses  Heart sounds: Normal heart sounds  No murmur (systolic) heard  No friction rub  Pulmonary:      Effort: Pulmonary effort is normal  No respiratory distress  Breath sounds: Normal breath sounds  No stridor  No wheezing or rales  Chest:      Breasts: Breasts are symmetrical          Right: Normal  No swelling, bleeding, inverted nipple, mass, nipple discharge, skin change or tenderness  Left: Normal  No swelling, bleeding, inverted nipple, mass, nipple discharge, skin change or tenderness  Abdominal:      General: Bowel sounds are normal  There is no distension  Palpations: Abdomen is soft  There is no mass  Tenderness: There is no abdominal tenderness  There is no guarding  Hernia: No hernia is present  Musculoskeletal:         General: Normal range of motion  Cervical back: Full passive range of motion without pain, normal range of motion and neck supple  Lymphadenopathy:      Cervical: No cervical adenopathy  Skin:     General: Skin is warm and dry  Findings: No rash  Neurological:      General: No focal deficit present  Mental Status: She is alert and oriented to person, place, and time  Mental status is at baseline  Cranial Nerves: No cranial nerve deficit  Sensory: No sensory deficit  Motor: No abnormal muscle tone        Coordination: Coordination normal       Gait: Gait normal       Deep Tendon Reflexes: Reflexes normal  Babinski sign absent on the right side  Psychiatric:         Mood and Affect: Mood normal          Speech: Speech normal          Behavior: Behavior normal          Thought Content:  Thought content normal          Judgment: Judgment normal

## 2021-06-15 ENCOUNTER — OFFICE VISIT (OUTPATIENT)
Dept: FAMILY MEDICINE CLINIC | Facility: CLINIC | Age: 41
End: 2021-06-15
Payer: COMMERCIAL

## 2021-06-15 VITALS
BODY MASS INDEX: 23.51 KG/M2 | HEART RATE: 51 BPM | TEMPERATURE: 97.2 F | RESPIRATION RATE: 16 BRPM | HEIGHT: 63 IN | DIASTOLIC BLOOD PRESSURE: 70 MMHG | WEIGHT: 132.7 LBS | OXYGEN SATURATION: 99 % | SYSTOLIC BLOOD PRESSURE: 110 MMHG

## 2021-06-15 DIAGNOSIS — K21.9 GASTROESOPHAGEAL REFLUX DISEASE WITHOUT ESOPHAGITIS: Primary | ICD-10-CM

## 2021-06-15 DIAGNOSIS — Z00.00 ANNUAL PHYSICAL EXAM: ICD-10-CM

## 2021-06-15 DIAGNOSIS — I10 ESSENTIAL HYPERTENSION: ICD-10-CM

## 2021-06-15 DIAGNOSIS — I05.0 RHEUMATIC MITRAL STENOSIS: ICD-10-CM

## 2021-06-15 DIAGNOSIS — E78.5 DYSLIPIDEMIA: ICD-10-CM

## 2021-06-15 DIAGNOSIS — R73.09 ABNORMAL GLUCOSE: ICD-10-CM

## 2021-06-15 PROBLEM — D72.825 BANDEMIA: Status: RESOLVED | Noted: 2021-03-11 | Resolved: 2021-06-15

## 2021-06-15 PROCEDURE — 3725F SCREEN DEPRESSION PERFORMED: CPT | Performed by: FAMILY MEDICINE

## 2021-06-15 PROCEDURE — 1036F TOBACCO NON-USER: CPT | Performed by: FAMILY MEDICINE

## 2021-06-15 PROCEDURE — 99395 PREV VISIT EST AGE 18-39: CPT | Performed by: FAMILY MEDICINE

## 2021-06-15 PROCEDURE — 3008F BODY MASS INDEX DOCD: CPT | Performed by: FAMILY MEDICINE

## 2021-06-15 PROCEDURE — 3074F SYST BP LT 130 MM HG: CPT | Performed by: FAMILY MEDICINE

## 2021-06-15 PROCEDURE — 3078F DIAST BP <80 MM HG: CPT | Performed by: FAMILY MEDICINE

## 2021-06-15 RX ORDER — SUCRALFATE 1 G/1
1 TABLET ORAL 4 TIMES DAILY
COMMUNITY

## 2021-06-15 NOTE — LETTER
Yu 15, 2021     Patient: Radhika Martin   YOB: 1980   Date of Visit: 6/15/2021       To Whom it May Concern:    Radhika Martin is under my professional care  She was seen in my office on 6/15/2021  She may return to work on 6/16/21  Medically excused 6/15/21       If you have any questions or concerns, please don't hesitate to call           Sincerely,          Reece Sánchez MD        CC: No Recipients

## 2021-08-28 ENCOUNTER — APPOINTMENT (OUTPATIENT)
Dept: LAB | Facility: HOSPITAL | Age: 41
End: 2021-08-28
Attending: FAMILY MEDICINE
Payer: COMMERCIAL

## 2021-08-28 DIAGNOSIS — I10 ESSENTIAL HYPERTENSION: ICD-10-CM

## 2021-08-28 LAB
ALBUMIN SERPL BCP-MCNC: 4.1 G/DL (ref 3.4–4.8)
ALP SERPL-CCNC: 58.9 U/L (ref 35–140)
ALT SERPL W P-5'-P-CCNC: 12 U/L (ref 5–54)
ANION GAP SERPL CALCULATED.3IONS-SCNC: 8 MMOL/L (ref 4–13)
AST SERPL W P-5'-P-CCNC: 14 U/L (ref 15–41)
BILIRUB SERPL-MCNC: 0.72 MG/DL (ref 0.3–1.2)
BUN SERPL-MCNC: 13 MG/DL (ref 6–20)
CALCIUM SERPL-MCNC: 8.9 MG/DL (ref 8.4–10.2)
CHLORIDE SERPL-SCNC: 103 MMOL/L (ref 96–108)
CHOLEST SERPL-MCNC: 238 MG/DL
CO2 SERPL-SCNC: 27 MMOL/L (ref 22–33)
CREAT SERPL-MCNC: 0.76 MG/DL (ref 0.4–1.1)
GFR SERPL CREATININE-BSD FRML MDRD: 99 ML/MIN/1.73SQ M
GLUCOSE P FAST SERPL-MCNC: 89 MG/DL (ref 70–105)
HDLC SERPL-MCNC: 50 MG/DL
LDLC SERPL CALC-MCNC: 155 MG/DL (ref 0–100)
NONHDLC SERPL-MCNC: 188 MG/DL
POTASSIUM SERPL-SCNC: 4 MMOL/L (ref 3.5–5)
PROT SERPL-MCNC: 7.6 G/DL (ref 6.4–8.3)
SODIUM SERPL-SCNC: 138 MMOL/L (ref 133–145)
TRIGL SERPL-MCNC: 163.7 MG/DL

## 2021-08-28 PROCEDURE — 36415 COLL VENOUS BLD VENIPUNCTURE: CPT

## 2021-08-28 PROCEDURE — 80061 LIPID PANEL: CPT | Performed by: FAMILY MEDICINE

## 2021-08-28 PROCEDURE — 80053 COMPREHEN METABOLIC PANEL: CPT

## 2021-08-31 DIAGNOSIS — E78.5 DYSLIPIDEMIA: Primary | ICD-10-CM

## 2021-08-31 RX ORDER — ROSUVASTATIN CALCIUM 10 MG/1
10 TABLET, COATED ORAL DAILY
Qty: 30 TABLET | Refills: 5 | Status: SHIPPED | OUTPATIENT
Start: 2021-08-31 | End: 2022-08-05 | Stop reason: SDUPTHER

## 2022-02-09 ENCOUNTER — TELEPHONE (OUTPATIENT)
Dept: FAMILY MEDICINE CLINIC | Facility: CLINIC | Age: 42
End: 2022-02-09

## 2022-02-09 DIAGNOSIS — I10 ESSENTIAL HYPERTENSION: ICD-10-CM

## 2022-02-09 RX ORDER — METOPROLOL SUCCINATE 100 MG/1
100 TABLET, EXTENDED RELEASE ORAL DAILY
Qty: 90 TABLET | Refills: 3 | Status: SHIPPED | OUTPATIENT
Start: 2022-02-09

## 2022-05-16 ENCOUNTER — HOSPITAL ENCOUNTER (EMERGENCY)
Facility: HOSPITAL | Age: 42
Discharge: HOME | End: 2022-05-16
Attending: EMERGENCY MEDICINE
Payer: COMMERCIAL

## 2022-05-16 VITALS
TEMPERATURE: 98.3 F | BODY MASS INDEX: 23.03 KG/M2 | RESPIRATION RATE: 18 BRPM | WEIGHT: 130 LBS | SYSTOLIC BLOOD PRESSURE: 157 MMHG | DIASTOLIC BLOOD PRESSURE: 80 MMHG | HEART RATE: 75 BPM | OXYGEN SATURATION: 99 %

## 2022-05-16 DIAGNOSIS — N39.0 UTI (URINARY TRACT INFECTION): Primary | ICD-10-CM

## 2022-05-16 LAB
BACTERIA UR QL AUTO: ABNORMAL /HPF
BILIRUB UR QL STRIP: NEGATIVE
CLARITY UR: ABNORMAL
COLOR UR: ABNORMAL
EXT PREG TEST URINE: NEGATIVE
EXT. CONTROL ED NAV: NORMAL
GLUCOSE UR STRIP-MCNC: NEGATIVE MG/DL
HGB UR QL STRIP.AUTO: ABNORMAL
KETONES UR STRIP-MCNC: NEGATIVE MG/DL
LEUKOCYTE ESTERASE UR QL STRIP: ABNORMAL
NITRITE UR QL STRIP: POSITIVE
NON-SQ EPI CELLS URNS QL MICRO: ABNORMAL /HPF
PH UR STRIP.AUTO: 6.5 [PH]
PROT UR STRIP-MCNC: ABNORMAL MG/DL
RBC #/AREA URNS AUTO: ABNORMAL /HPF
SP GR UR STRIP.AUTO: 1.01 (ref 1–1.03)
UROBILINOGEN UR QL STRIP.AUTO: 0.2 E.U./DL
WBC #/AREA URNS AUTO: ABNORMAL /HPF

## 2022-05-16 PROCEDURE — 81001 URINALYSIS AUTO W/SCOPE: CPT

## 2022-05-16 PROCEDURE — 87086 URINE CULTURE/COLONY COUNT: CPT

## 2022-05-16 PROCEDURE — 99283 EMERGENCY DEPT VISIT LOW MDM: CPT

## 2022-05-16 PROCEDURE — 99284 EMERGENCY DEPT VISIT MOD MDM: CPT | Performed by: EMERGENCY MEDICINE

## 2022-05-16 PROCEDURE — 81025 URINE PREGNANCY TEST: CPT

## 2022-05-16 RX ORDER — CEPHALEXIN 250 MG/1
500 CAPSULE ORAL ONCE
Status: COMPLETED | OUTPATIENT
Start: 2022-05-16 | End: 2022-05-16

## 2022-05-16 RX ORDER — CEPHALEXIN 500 MG/1
500 CAPSULE ORAL EVERY 12 HOURS SCHEDULED
Qty: 10 CAPSULE | Refills: 0 | Status: SHIPPED | OUTPATIENT
Start: 2022-05-16 | End: 2022-05-21

## 2022-05-16 RX ADMIN — CEPHALEXIN 500 MG: 250 CAPSULE ORAL at 23:32

## 2022-05-17 NOTE — ED ATTENDING ATTESTATION
5/16/2022  I, Ryan Ken MD, saw and evaluated the patient  I have discussed the patient with the resident/non-physician practitioner and agree with the resident's/non-physician practitioner's findings, Plan of Care, and MDM as documented in the resident's/non-physician practitioner's note, except where noted  All available labs and Radiology studies were reviewed  I was present for key portions of any procedure(s) performed by the resident/non-physician practitioner and I was immediately available to provide assistance  At this point I agree with the current assessment done in the Emergency Department  I have conducted an independent evaluation of this patient a history and physical is as follows: Patient is a 37year old female with dysuria and hematuria today  No fever  No N/V  LMP - 3 weeks ago  Was last seen in this ED on 1/15/21 for gastroenteritis  [facility] website checked on this patient and no Rx found  NCAT  Mask in place  Neck supple  Lungs clear  Heart regular without murmur  Abdomen soft and nontender  Good bowel sounds  No CVAT  No rash noted  No LE edema  DDx including but not limited to: UTI, interstitial cystitis, hemorrhagic cystitis; doubt pyelonephritis, vaginitis; doubt acute surgical intraabdominal process or renal colic  Will check UA and tx for UTI       ED Course         Critical Care Time  Procedures

## 2022-05-17 NOTE — ED PROVIDER NOTES
History  Chief Complaint   Patient presents with    Possible UTI     Pt states starting today she had urinary frequency and burning with urination, noticed blood in urine this evening  Denies flank pain, N/V, fever     Latosha Amador is a 37year old female presenting today for evaluation one day of increased urinary frequency, dysuria, hematuria, suprapubic pressure  Patient's symptoms have worsened throughout today  Patient is concerned that she has urinary tract  Patient denies any fevers or chills  Patient denies any flank tenderness  Patient denies significant abdominal tenderness  She denies any history of kidney stones  Patient tried to treat her symptoms today with "Azo," but her symptoms have not improved  History provided by:  Patient   used: No        Prior to Admission Medications   Prescriptions Last Dose Informant Patient Reported? Taking?   metoprolol succinate (TOPROL-XL) 100 mg 24 hr tablet   No No   Sig: Take 1 tablet (100 mg total) by mouth daily   rosuvastatin (CRESTOR) 10 MG tablet   No No   Sig: Take 1 tablet (10 mg total) by mouth daily   sucralfate (CARAFATE) 1 g tablet   Yes No   Sig: Take 1 g by mouth 4 (four) times a day      Facility-Administered Medications: None       Past Medical History:   Diagnosis Date    Hypertension     Mitral stenosis        Past Surgical History:   Procedure Laterality Date    KNEE SURGERY         Family History   Problem Relation Age of Onset    Heart disease Father     Diabetes Father     Stomach cancer Brother     Hypertension Mother      I have reviewed and agree with the history as documented      E-Cigarette/Vaping    E-Cigarette Use Never User      E-Cigarette/Vaping Substances     Social History     Tobacco Use    Smoking status: Never Smoker    Smokeless tobacco: Never Used   Vaping Use    Vaping Use: Never used   Substance Use Topics    Alcohol use: Never     Comment: none    Drug use: Never        Review of Systems Constitutional: Negative for chills and fever  HENT: Negative for ear pain and sore throat  Eyes: Negative for pain and visual disturbance  Respiratory: Negative for cough and shortness of breath  Cardiovascular: Negative for chest pain and palpitations  Gastrointestinal: Positive for abdominal pain  Negative for nausea and vomiting  Genitourinary: Positive for dysuria, frequency and hematuria  Negative for vaginal bleeding, vaginal discharge and vaginal pain  Musculoskeletal: Negative for arthralgias and back pain  Skin: Negative for color change and rash  Neurological: Negative for seizures and syncope  All other systems reviewed and are negative  Physical Exam  ED Triage Vitals [05/16/22 2118]   Temperature Pulse Respirations Blood Pressure SpO2   98 3 °F (36 8 °C) 75 18 157/80 99 %      Temp Source Heart Rate Source Patient Position - Orthostatic VS BP Location FiO2 (%)   Oral Monitor -- -- --      Pain Score       --             Orthostatic Vital Signs  Vitals:    05/16/22 2118   BP: 157/80   Pulse: 75       Physical Exam  Vitals and nursing note reviewed  Constitutional:       General: She is not in acute distress  Appearance: Normal appearance  Comments: Patient is well-appearing and not in any acute distress  HENT:      Head: Normocephalic and atraumatic  Mouth/Throat:      Mouth: Mucous membranes are moist       Pharynx: Oropharynx is clear  Eyes:      General: No scleral icterus  Conjunctiva/sclera: Conjunctivae normal    Cardiovascular:      Rate and Rhythm: Normal rate and regular rhythm  Heart sounds: Normal heart sounds  Pulmonary:      Effort: Pulmonary effort is normal  No respiratory distress  Breath sounds: Normal breath sounds  Abdominal:      General: Abdomen is flat  There is no distension  Tenderness: There is no abdominal tenderness  There is no right CVA tenderness, left CVA tenderness, guarding or rebound        Comments: Patient noted pressure in the suprapubic region to palpation, but denied significant pain   Musculoskeletal:         General: No tenderness or signs of injury  Cervical back: Neck supple  No rigidity  Skin:     General: Skin is warm  Coloration: Skin is not jaundiced  Findings: No erythema or rash  Neurological:      General: No focal deficit present  Mental Status: She is alert  Mental status is at baseline  Psychiatric:         Mood and Affect: Mood normal          Behavior: Behavior normal          ED Medications  Medications   cephalexin (KEFLEX) capsule 500 mg (500 mg Oral Given 5/16/22 2332)       Diagnostic Studies  Results Reviewed     Procedure Component Value Units Date/Time    Urine Microscopic [[id number]]  (Abnormal) Collected: 05/16/22 2308    Lab Status: Final result Specimen: Urine, Clean Catch Updated: 05/16/22 2341     RBC, UA 10-20 /hpf      WBC, UA 10-20 /hpf      Epithelial Cells Occasional /hpf      Bacteria, UA Occasional /hpf     Urine culture [[id number]] Collected: 05/16/22 2308    Lab Status:  In process Specimen: Urine, Clean Catch Updated: 05/16/22 2341    UA w Reflex to Microscopic w Reflex to Culture [[id number]]  (Abnormal) Collected: 05/16/22 2308    Lab Status: Final result Specimen: Urine, Clean Catch Updated: 05/16/22 2319     Color, UA Debby     Clarity, UA Slightly Cloudy     Specific [place], UA 1 010     pH, UA 6 5     Leukocytes, UA Moderate     Nitrite, UA Positive     Protein, UA 30 (1+) mg/dl      Glucose, UA Negative mg/dl      Ketones, UA Negative mg/dl      Urobilinogen, UA 0 2 E U /dl      Bilirubin, UA Negative     Blood, UA Large    POCT pregnancy, urine [[id number]]  (Normal) Resulted: 05/16/22 2312    Lab Status: Final result Updated: 05/16/22 2312     EXT PREG TEST UR (Ref: Negative) negative     Control valid                 No orders to display         Procedures  Procedures      ED Course                             SBIRT 20yo+    Flowsheet Row Most Recent Value   SBIRT (25 yo +)    In order to provide better care to our patients, we are screening all of our patients for alcohol and drug use  Would it be okay to ask you these screening questions? Unable to answer at this time Filed at: 05/16/2022 4856                [facility]  Number of Diagnoses or Management Options  UTI (urinary tract infection): new and requires workup     Amount and/or Complexity of Data Reviewed  Clinical lab tests: ordered and reviewed    Risk of Complications, Morbidity, and/or Mortality  General comments: Alfreda Hendricks is a 37year old female presenting today for evaluation one day of increased urinary frequency, dysuria, hematuria, suprapubic pressure  Patient has concern for possible urinary tract infection  Patient's vital signs are unremarkable, she was afebrile  Exam, patient had pressure in the suprapubic region to palpation, she has not have significant abdominal tenderness, no rebound, no guarding  Patient did not have any flank tenderness  I do not suspect renal colic or pyelonephritis at this time  Patient's urine sample was jefferson in color, there were no blood clots or gross blood  UA was positive for nitrites  Urinary tract infection confirmed, patient given a dose of Keflex here in the emergency department  Patient is stable for discharge  Patient prescribed a course of Keflex for her UTI  I advise that she follow-up with her PCP  I gave her very strict return precautions, she was agreeable to the plan      Patient Progress  Patient progress: stable      Disposition  Final diagnoses:   UTI (urinary tract infection)     Time reflects when diagnosis was documented in both MDM as applicable and the Disposition within this note     Time User Action Codes Description Comment    5/16/2022 11:25 PM Edu Gold Add [N39 0] UTI (urinary tract infection)       ED Disposition     ED Disposition   Discharge    Condition   Stable    Date/Time   Mon May 16, 2022 11:25 PM    Comment   Monica Buileschica discharge to home/self care  Follow-up Information     Follow up With Specialties Details Why Contact Info    Eusebia Cox MD Family Medicine Schedule an appointment as soon as possible for a visit   [address]  [id number] [address] [id number] [phone]            Discharge Medication List as of 5/16/2022 11:27 PM      START taking these medications    Details   cephalexin (KEFLEX) 500 mg capsule Take 1 capsule (500 mg total) by mouth every 12 (twelve) hours for 5 days, Starting Mon 5/16/2022, Until Sat 5/21/2022, Normal         CONTINUE these medications which have NOT CHANGED    Details   metoprolol succinate (TOPROL-XL) 100 mg 24 hr tablet Take 1 tablet (100 mg total) by mouth daily, Starting Wed 2/9/2022, Normal      rosuvastatin (CRESTOR) 10 MG tablet Take 1 tablet (10 mg total) by mouth daily, Starting Tue 8/31/2021, Normal      sucralfate (CARAFATE) 1 g tablet Take 1 g by mouth 4 (four) times a day, Historical Med           No discharge procedures on file  PDMP Review       Value Time User    PDMP Reviewed  Yes 5/16/2022 11:11 PM Farhana Doshi MD           ED Provider  Attending physically available and evaluated Chirag gricel  DARIEN managed the patient along with the ED Attending      Electronically Signed by         Bhavesh Valle DO  05/17/22 9208

## 2022-05-17 NOTE — DISCHARGE INSTRUCTIONS
Call and schedule a follow-up appointment with your primary doctor  Take the antibiotics as prescribed, twice daily for the next 5 days  Return to the emergency department should your symptoms worsen, or should he develop new fevers/chills, increasing pain in your back or abdomen, or any other concerning symptoms

## 2022-05-18 LAB — BACTERIA UR CULT: NORMAL

## 2022-07-21 ENCOUNTER — HOSPITAL ENCOUNTER (EMERGENCY)
Facility: HOSPITAL | Age: 42
Discharge: HOME | End: 2022-07-21
Attending: EMERGENCY MEDICINE
Payer: COMMERCIAL

## 2022-07-21 ENCOUNTER — APPOINTMENT (EMERGENCY)
Dept: RADIOLOGY | Facility: HOSPITAL | Age: 42
End: 2022-07-21
Payer: COMMERCIAL

## 2022-07-21 VITALS
BODY MASS INDEX: 23.74 KG/M2 | OXYGEN SATURATION: 97 % | SYSTOLIC BLOOD PRESSURE: 141 MMHG | TEMPERATURE: 97.6 F | RESPIRATION RATE: 18 BRPM | WEIGHT: 134 LBS | HEART RATE: 61 BPM | DIASTOLIC BLOOD PRESSURE: 66 MMHG

## 2022-07-21 DIAGNOSIS — L60.0 INGROWN TOENAIL: Primary | ICD-10-CM

## 2022-07-21 DIAGNOSIS — L03.90 CELLULITIS: ICD-10-CM

## 2022-07-21 PROCEDURE — 99285 EMERGENCY DEPT VISIT HI MDM: CPT | Performed by: PHYSICIAN ASSISTANT

## 2022-07-21 PROCEDURE — 73660 X-RAY EXAM OF TOE(S): CPT

## 2022-07-21 PROCEDURE — 99283 EMERGENCY DEPT VISIT LOW MDM: CPT

## 2022-07-21 RX ORDER — CEPHALEXIN 500 MG/1
500 CAPSULE ORAL EVERY 12 HOURS SCHEDULED
Qty: 10 CAPSULE | Refills: 0 | Status: SHIPPED | OUTPATIENT
Start: 2022-07-21 | End: 2022-07-26

## 2022-07-21 RX ORDER — SULFAMETHOXAZOLE AND TRIMETHOPRIM 800; 160 MG/1; MG/1
1 TABLET ORAL 2 TIMES DAILY
Qty: 10 TABLET | Refills: 0 | Status: SHIPPED | OUTPATIENT
Start: 2022-07-21 | End: 2022-07-26

## 2022-07-21 NOTE — ED PROVIDER NOTES
History  Chief Complaint   Patient presents with    Toe Pain     Pt reports toenail infection in L big toe x1 month +drainage, was on abx and was doing better but still having a lot of pain and can't work d/t pain        History provided by:  Patient  Toe Pain  Location:  Left great toe  Quality:  Ache  Severity:  Mild  Onset quality:  Gradual  Duration:  1 month  Timing:  Intermittent  Progression:  Waxing and waning  Chronicity:  New  Context:  Patient presents emergency room with a chronic ingrown toenail on her left great toe  She states that she was in [facility] and had the medial nail removed with no improvement  She was placed on penicillin for a short-term which did not help her symptoms  She complains of chronic pain and drainage from the area  She denies any fever or chills  She denies any systemic symptoms of nausea, vomiting, diarrhea, weakness, fatigue  Associated symptoms: no abdominal pain, no chest pain, no congestion, no cough, no diarrhea, no ear pain, no fatigue, no fever, no headaches, no loss of consciousness, no myalgias, no nausea, no rash, no rhinorrhea, no shortness of breath, no sore throat, no vomiting and no wheezing    Associated symptoms comment:  Mild erythema of the medial aspect of the left great toenail      Prior to Admission Medications   Prescriptions Last Dose Informant Patient Reported?  Taking?   metoprolol succinate (TOPROL-XL) 100 mg 24 hr tablet   No No   Sig: Take 1 tablet (100 mg total) by mouth daily   rosuvastatin (CRESTOR) 10 MG tablet   No No   Sig: Take 1 tablet (10 mg total) by mouth daily   sucralfate (CARAFATE) 1 g tablet   Yes No   Sig: Take 1 g by mouth 4 (four) times a day      Facility-Administered Medications: None       Past Medical History:   Diagnosis Date    Hypertension     Mitral stenosis        Past Surgical History:   Procedure Laterality Date    KNEE SURGERY         Family History   Problem Relation Age of Onset    Heart disease Father  Diabetes Father     Stomach cancer Brother     Hypertension Mother      I have reviewed and agree with the history as documented  E-Cigarette/Vaping    E-Cigarette Use Never User      E-Cigarette/Vaping Substances     Social History     Tobacco Use    Smoking status: Never Smoker    Smokeless tobacco: Never Used   Vaping Use    Vaping Use: Never used   Substance Use Topics    Alcohol use: Never     Comment: none    Drug use: Never       Review of Systems   Constitutional: Positive for activity change  Negative for appetite change, chills, fatigue and fever  HENT: Negative for congestion, ear pain, rhinorrhea and sore throat  Respiratory: Negative for cough, shortness of breath and wheezing  Cardiovascular: Negative for chest pain  Gastrointestinal: Negative for abdominal pain, diarrhea, nausea and vomiting  Musculoskeletal: Negative for myalgias  Skin: Positive for color change  Negative for pallor, rash and wound  Neurological: Negative for loss of consciousness and headaches  All other systems reviewed and are negative  Physical Exam  Physical Exam  Vitals and nursing note reviewed  Constitutional:       General: She is not in acute distress  Appearance: Normal appearance  She is normal weight  She is not ill-appearing, toxic-appearing or diaphoretic  HENT:      Head: Normocephalic and atraumatic  Right Ear: External ear normal       Left Ear: External ear normal       Nose: Nose normal    Eyes:      General:         Right eye: No discharge  Left eye: No discharge  Conjunctiva/sclera: Conjunctivae normal    Pulmonary:      Effort: Pulmonary effort is normal  No respiratory distress  Musculoskeletal:      Comments: Inspection of left great toenail-there is mild erythema present over the medial nail with clear yellow drainage  It is tender upon palpation  The remainder of the foot and ankle are nontender with good range of motion    Capillary refills less than 2 seconds  Skin:     Capillary Refill: Capillary refill takes less than 2 seconds  Comments: Mild erythema over the medial nail with associated clear yellow drainage  Neurological:      Mental Status: She is alert and oriented to person, place, and time  Psychiatric:         Mood and Affect: Mood normal          Behavior: Behavior normal          Thought Content: Thought content normal          Judgment: Judgment normal          Vital Signs  ED Triage Vitals   Temperature Pulse Respirations Blood Pressure SpO2   07/21/22 1131 07/21/22 1131 07/21/22 1131 07/21/22 1131 07/21/22 1131   97 6 °F (36 4 °C) 61 18 141/66 97 %      Temp Source Heart Rate Source Patient Position - Orthostatic VS BP Location FiO2 (%)   07/21/22 1131 07/21/22 1131 -- 07/21/22 1131 --   Oral Monitor  Left arm       Pain Score       07/21/22 1132       8           Vitals:    07/21/22 1131   BP: 141/66   Pulse: 61         Visual Acuity      ED Medications  Medications - No data to display    Diagnostic Studies  Results Reviewed     None                 XR toe great min 2 views LEFT   ED Interpretation by Shila Ontiveros PA-C (07/21 1327)   No acute bony abnormality suggesting osteomyelitis      Final Result by Karlene Deras MD (07/21 1446)      No acute osseous abnormality  Workstation performed: [id number]                    Procedures  Procedures         ED Course                               SBIRT 20yo+    Flowsheet Row Most Recent Value   SBIRT (23 yo +)    In order to provide better care to our patients, we are screening all of our patients for alcohol and drug use  Would it be okay to ask you these screening questions?  Unable to answer at this time Filed at: 07/21/2022 1132                    MDM  Number of Diagnoses or Management Options  Cellulitis: new and requires workup  Ingrown toenail: new and requires workup  Diagnosis management comments: Patient presents to the emergency room with a chronic infected great toenail  She had had her medial nail previously excised and has still been having problems  She was concerned that the infection might be deeper within her toe  An x-ray was performed which did not demonstrate any evidence of osteomyelitis  She did have some mild erythema over the medial aspect of the nail with some clear yellow drainage present  She was placed on Keflex and Bactrim to take twice daily for 5 days  She will soak her foot in warm water for 20 minutes 3 times a day  She was given a referral to follow up with the podiatrist   She was given a note for work  Amount and/or Complexity of Data Reviewed  Tests in the radiology section of CPT®: ordered and reviewed  Tests in the medicine section of CPT®: ordered and reviewed    Risk of Complications, Morbidity, and/or Mortality  Presenting problems: moderate  Diagnostic procedures: moderate  Management options: moderate    Patient Progress  Patient progress: stable      Disposition  Final diagnoses:   Ingrown toenail   Cellulitis - great toe     Time reflects when diagnosis was documented in both MDM as applicable and the Disposition within this note     Time User Action Codes Description Comment    7/21/2022  1:28 PM Maxwell Braden Add [L60 0] Ingrown toenail     7/21/2022  1:28 PM Maxwell Braden Add [L03 90] Cellulitis     7/21/2022  1:28 PM Maxwell Braden Modify [L03 90] Cellulitis great toe      ED Disposition     ED Disposition   Discharge    Condition   Stable    Date/Time   Thu Jul 21, 2022  1:28 PM    Comment   Joline Gottron discharge to home/self care                 Follow-up Information     Follow up With Specialties Details Why Contact Info    Sakina Gtz DPM Podiatry Schedule an appointment as soon as possible for a visit   [id number] [address] [facility]  [phone]            Discharge Medication List as of 7/21/2022  1:31 PM      START taking these medications    Details cephalexin (KEFLEX) 500 mg capsule Take 1 capsule (500 mg total) by mouth every 12 (twelve) hours for 5 days, Starting Thu 7/21/2022, Until Tue 7/26/2022, Normal      sulfamethoxazole-trimethoprim (BACTRIM DS) 800-160 mg per tablet Take 1 tablet by mouth 2 (two) times a day for 5 days smx-tmp DS (BACTRIM) 800-160 mg tabs (1tab q12 D10), Starting Thu 7/21/2022, Until Tue 7/26/2022, Normal         CONTINUE these medications which have NOT CHANGED    Details   metoprolol succinate (TOPROL-XL) 100 mg 24 hr tablet Take 1 tablet (100 mg total) by mouth daily, Starting Wed 2/9/2022, Normal      rosuvastatin (CRESTOR) 10 MG tablet Take 1 tablet (10 mg total) by mouth daily, Starting Tue 8/31/2021, Normal      sucralfate (CARAFATE) 1 g tablet Take 1 g by mouth 4 (four) times a day, Historical Med             No discharge procedures on file      PDMP Review       Value Time User    PDMP Reviewed  Yes 5/16/2022 11:11 PM Giacomo Puentes MD          ED Provider  Electronically Signed by           Rainer Rosales PA-C  07/21/22 2031

## 2022-07-21 NOTE — Clinical Note
Ranjit Guerrero was seen and treated in our emergency department on 7/21/2022  No restrictions            Diagnosis:     [place]  may return to work on return date  She may return on this date: 07/23/2022         If you have any questions or concerns, please don't hesitate to call        Rafael Berry PA-C    ______________________________           _______________          _______________  Hospital Representative                              Date                                Time

## 2022-08-01 ENCOUNTER — OFFICE VISIT (OUTPATIENT)
Dept: FAMILY MEDICINE CLINIC | Facility: CLINIC | Age: 42
End: 2022-08-01
Payer: COMMERCIAL

## 2022-08-01 VITALS
WEIGHT: 136 LBS | SYSTOLIC BLOOD PRESSURE: 130 MMHG | TEMPERATURE: 97.2 F | HEART RATE: 72 BPM | DIASTOLIC BLOOD PRESSURE: 70 MMHG | BODY MASS INDEX: 25.03 KG/M2 | HEIGHT: 62 IN | OXYGEN SATURATION: 99 % | RESPIRATION RATE: 16 BRPM

## 2022-08-01 DIAGNOSIS — E78.5 DYSLIPIDEMIA: ICD-10-CM

## 2022-08-01 DIAGNOSIS — Z86.16 HISTORY OF COVID-19: ICD-10-CM

## 2022-08-01 DIAGNOSIS — I10 ESSENTIAL HYPERTENSION: ICD-10-CM

## 2022-08-01 DIAGNOSIS — Z11.59 NEED FOR HEPATITIS C SCREENING TEST: ICD-10-CM

## 2022-08-01 DIAGNOSIS — Z12.31 ENCOUNTER FOR SCREENING MAMMOGRAM FOR MALIGNANT NEOPLASM OF BREAST: ICD-10-CM

## 2022-08-01 DIAGNOSIS — Z00.00 ANNUAL PHYSICAL EXAM: Primary | ICD-10-CM

## 2022-08-01 DIAGNOSIS — I34.2 NONRHEUMATIC MITRAL VALVE STENOSIS: ICD-10-CM

## 2022-08-01 DIAGNOSIS — R42 DIZZINESS: ICD-10-CM

## 2022-08-01 PROCEDURE — 3725F SCREEN DEPRESSION PERFORMED: CPT | Performed by: FAMILY MEDICINE

## 2022-08-01 PROCEDURE — 99396 PREV VISIT EST AGE 40-64: CPT | Performed by: FAMILY MEDICINE

## 2022-08-01 NOTE — PROGRESS NOTES
Assessment/Plan:         Problem List Items Addressed This Visit        Cardiovascular and Mediastinum    Essential hypertension     Well controlled on current therapy continue with current medications and will reassess next visit           Nonrheumatic mitral valve stenosis     Pt wishes to change cardiology echo done 2019         Relevant Orders    Ambulatory Referral to Cardiology       Other    History of COVID-19     No sequelae           Annual physical exam - Primary    Relevant Orders    CBC and differential    Comprehensive metabolic panel    Lipid panel    Dyslipidemia     Pt stopped her meds will recheck labs          Dizziness     Nl neuro exam           Other Visit Diagnoses     Encounter for screening mammogram for malignant neoplasm of breast        Relevant Orders    Mammo screening bilateral w 3d & cad    Need for hepatitis C screening test        Relevant Orders    Hepatitis C Antibody ([facility])            Subjective: pt here fror annual physical has been experiencing dizizness     Patient ID: Lillie Harper is a 37 y o  female  HPI    The following portions of the patient's history were reviewed and updated as appropriate:   Past Medical History:  She has a past medical history of Hypertension and Mitral stenosis  ,  _______________________________________________________________________  Medical Problems:  does not have any pertinent problems on file ,  _______________________________________________________________________  Past Surgical History:   has a past surgical history that includes Knee surgery  ,  _______________________________________________________________________  Family History:  family history includes Diabetes in her father; Heart disease in her father; Hypertension in her mother; Stomach cancer in her brother ,  _______________________________________________________________________  Social History:   reports that she has never smoked   She has never used smokeless tobacco  She reports that she does not drink alcohol and does not use drugs  ,  _______________________________________________________________________  Allergies:  has No Known Allergies     _______________________________________________________________________  Current Outpatient Medications   Medication Sig Dispense Refill    metoprolol succinate (TOPROL-XL) 100 mg 24 hr tablet Take 1 tablet (100 mg total) by mouth daily 90 tablet 3    rosuvastatin (CRESTOR) 10 MG tablet Take 1 tablet (10 mg total) by mouth daily (Patient not taking: Reported on 8/1/2022) 30 tablet 5    sucralfate (CARAFATE) 1 g tablet Take 1 g by mouth 4 (four) times a day (Patient not taking: Reported on 8/1/2022)       No current facility-administered medications for this visit      _______________________________________________________________________  Review of Systems   Constitutional: Negative for appetite change, chills, fatigue and fever  Respiratory: Negative for cough, chest tightness and shortness of breath  Cardiovascular: Negative for chest pain, palpitations and leg swelling  Gastrointestinal: Negative for abdominal pain, constipation, diarrhea, nausea and vomiting  Genitourinary: Negative for difficulty urinating and frequency  Musculoskeletal: Negative for arthralgias, back pain and neck pain  Skin: Negative for rash  Neurological: Positive for dizziness  Negative for weakness, light-headedness, numbness and headaches  Hematological: Does not bruise/bleed easily  Psychiatric/Behavioral: Negative for dysphoric mood and sleep disturbance  The patient is not nervous/anxious  Objective:  Vitals:    08/01/22 1526 08/01/22 1547   BP: 130/90 130/70   BP Location: Left arm    Patient Position: Sitting    Cuff Size: Standard    Pulse: 72    Resp: 16    Temp: (!) 97 2 °F (36 2 °C)    TempSrc: Temporal    SpO2: 99%    Weight: 61 7 kg (136 lb)    Height: 5' 2" (1 575 m)      Body mass index is 24 87 kg/m²  Physical Exam  Vitals reviewed  Constitutional:       General: She is not in acute distress  Appearance: Normal appearance  She is well-developed  HENT:      Head: Normocephalic  Right Ear: Tympanic membrane, ear canal and external ear normal       Left Ear: Tympanic membrane, ear canal and external ear normal       Nose: Nose normal       Mouth/Throat:      Pharynx: No oropharyngeal exudate  Eyes:      General: Lids are normal       Extraocular Movements: Extraocular movements intact  Conjunctiva/sclera: Conjunctivae normal       Pupils: Pupils are equal, round, and reactive to light  Neck:      Thyroid: No thyromegaly  Vascular: No carotid bruit  Cardiovascular:      Rate and Rhythm: Normal rate and regular rhythm  Pulses: Normal pulses  Heart sounds: Normal heart sounds  No murmur heard  No friction rub  Pulmonary:      Effort: Pulmonary effort is normal  No respiratory distress  Breath sounds: Normal breath sounds  No stridor  No wheezing or rales  Chest:   Breasts: Breasts are symmetrical       Right: Normal  No swelling, bleeding, inverted nipple, mass, nipple discharge, skin change or tenderness  Left: Normal  No swelling, bleeding, inverted nipple, mass, nipple discharge, skin change or tenderness  Abdominal:      General: Bowel sounds are normal  There is no distension  Palpations: Abdomen is soft  There is no mass  Tenderness: There is no abdominal tenderness  There is no guarding  Hernia: No hernia is present  Musculoskeletal:         General: Normal range of motion  Cervical back: Full passive range of motion without pain, normal range of motion and neck supple  Lymphadenopathy:      Cervical: No cervical adenopathy  Skin:     General: Skin is warm and dry  Findings: No rash  Comments: Nl appearing moles   Neurological:      General: No focal deficit present        Mental Status: She is alert and oriented to person, place, and time  Mental status is at baseline  Cranial Nerves: No cranial nerve deficit  Sensory: No sensory deficit  Motor: No abnormal muscle tone  Coordination: Coordination normal       Gait: Gait normal       Deep Tendon Reflexes: Reflexes normal  Babinski sign absent on the right side  Psychiatric:         Mood and Affect: Mood normal          Speech: Speech normal          Behavior: Behavior normal          Thought Content:  Thought content normal          Judgment: Judgment normal

## 2022-08-04 ENCOUNTER — APPOINTMENT (OUTPATIENT)
Dept: LAB | Facility: HOSPITAL | Age: 42
End: 2022-08-04
Attending: FAMILY MEDICINE
Payer: COMMERCIAL

## 2022-08-04 DIAGNOSIS — Z13.9 SCREENING FOR UNSPECIFIED CONDITION: ICD-10-CM

## 2022-08-04 LAB
ALBUMIN SERPL BCP-MCNC: 4 G/DL (ref 3.5–5)
ALP SERPL-CCNC: 64 U/L (ref 34–104)
ALT SERPL W P-5'-P-CCNC: 14 U/L (ref 7–52)
ANION GAP SERPL CALCULATED.3IONS-SCNC: 8 MMOL/L (ref 4–13)
AST SERPL W P-5'-P-CCNC: 18 U/L (ref 13–39)
BASOPHILS # BLD AUTO: 0.05 THOUSANDS/ΜL (ref 0–0.1)
BASOPHILS NFR BLD AUTO: 1 % (ref 0–1)
BILIRUB SERPL-MCNC: 0.9 MG/DL (ref 0.2–1)
BUN SERPL-MCNC: 9 MG/DL (ref 5–25)
CALCIUM SERPL-MCNC: 8.9 MG/DL (ref 8.4–10.2)
CHLORIDE SERPL-SCNC: 102 MMOL/L (ref 96–108)
CHOLEST SERPL-MCNC: 244 MG/DL
CO2 SERPL-SCNC: 26 MMOL/L (ref 21–32)
CREAT SERPL-MCNC: 0.72 MG/DL (ref 0.6–1.3)
EOSINOPHIL # BLD AUTO: 0.03 THOUSAND/ΜL (ref 0–0.61)
EOSINOPHIL NFR BLD AUTO: 1 % (ref 0–6)
ERYTHROCYTE [DISTWIDTH] IN BLOOD BY AUTOMATED COUNT: 11.9 % (ref 11.6–15.1)
GFR SERPL CREATININE-BSD FRML MDRD: 105 ML/MIN/1.73SQ M
GLUCOSE P FAST SERPL-MCNC: 78 MG/DL (ref 65–99)
HCT VFR BLD AUTO: 41.7 % (ref 34.8–46.1)
HCV AB SER QL: NORMAL
HDLC SERPL-MCNC: 49 MG/DL
HGB BLD-MCNC: 14.1 G/DL (ref 11.5–15.4)
IMM GRANULOCYTES # BLD AUTO: 0.01 THOUSAND/UL (ref 0–0.2)
IMM GRANULOCYTES NFR BLD AUTO: 0 % (ref 0–2)
LDLC SERPL CALC-MCNC: 130 MG/DL (ref 0–100)
LYMPHOCYTES # BLD AUTO: 1.33 THOUSANDS/ΜL (ref 0.6–4.47)
LYMPHOCYTES NFR BLD AUTO: 25 % (ref 14–44)
MCH RBC QN AUTO: 32.9 PG (ref 26.8–34.3)
MCHC RBC AUTO-ENTMCNC: 33.8 G/DL (ref 31.4–37.4)
MCV RBC AUTO: 97 FL (ref 82–98)
MONOCYTES # BLD AUTO: 0.36 THOUSAND/ΜL (ref 0.17–1.22)
MONOCYTES NFR BLD AUTO: 7 % (ref 4–12)
NEUTROPHILS # BLD AUTO: 3.5 THOUSANDS/ΜL (ref 1.85–7.62)
NEUTS SEG NFR BLD AUTO: 66 % (ref 43–75)
NONHDLC SERPL-MCNC: 195 MG/DL
NRBC BLD AUTO-RTO: 0 /100 WBCS
PLATELET # BLD AUTO: 209 THOUSANDS/UL (ref 149–390)
PMV BLD AUTO: 11.7 FL (ref 8.9–12.7)
POTASSIUM SERPL-SCNC: 3.7 MMOL/L (ref 3.5–5.3)
PROT SERPL-MCNC: 7.1 G/DL (ref 6.4–8.4)
RBC # BLD AUTO: 4.29 MILLION/UL (ref 3.81–5.12)
SODIUM SERPL-SCNC: 136 MMOL/L (ref 135–147)
TRIGL SERPL-MCNC: 327 MG/DL
WBC # BLD AUTO: 5.28 THOUSAND/UL (ref 4.31–10.16)

## 2022-08-04 PROCEDURE — 36415 COLL VENOUS BLD VENIPUNCTURE: CPT

## 2022-08-04 PROCEDURE — 80053 COMPREHEN METABOLIC PANEL: CPT

## 2022-08-04 PROCEDURE — 80061 LIPID PANEL: CPT

## 2022-08-04 PROCEDURE — 86803 HEPATITIS C AB TEST: CPT

## 2022-08-04 PROCEDURE — 85025 COMPLETE CBC W/AUTO DIFF WBC: CPT

## 2022-08-05 DIAGNOSIS — E78.5 DYSLIPIDEMIA: Primary | ICD-10-CM

## 2022-08-05 DIAGNOSIS — E78.5 DYSLIPIDEMIA: ICD-10-CM

## 2022-08-05 RX ORDER — ROSUVASTATIN CALCIUM 10 MG/1
10 TABLET, COATED ORAL DAILY
Qty: 30 TABLET | Refills: 5 | Status: SHIPPED | OUTPATIENT
Start: 2022-08-05

## 2022-08-18 ENCOUNTER — CONSULT (OUTPATIENT)
Dept: CARDIOLOGY CLINIC | Facility: CLINIC | Age: 42
End: 2022-08-18
Payer: COMMERCIAL

## 2022-08-18 VITALS
HEART RATE: 72 BPM | BODY MASS INDEX: 24.66 KG/M2 | SYSTOLIC BLOOD PRESSURE: 118 MMHG | HEIGHT: 62 IN | DIASTOLIC BLOOD PRESSURE: 80 MMHG | TEMPERATURE: 97.8 F | WEIGHT: 134 LBS | OXYGEN SATURATION: 98 %

## 2022-08-18 DIAGNOSIS — E78.2 MIXED HYPERLIPIDEMIA: ICD-10-CM

## 2022-08-18 DIAGNOSIS — I34.2 NONRHEUMATIC MITRAL VALVE STENOSIS: Primary | ICD-10-CM

## 2022-08-18 PROCEDURE — 99203 OFFICE O/P NEW LOW 30 MIN: CPT | Performed by: INTERNAL MEDICINE

## 2022-08-18 PROCEDURE — 93000 ELECTROCARDIOGRAM COMPLETE: CPT | Performed by: INTERNAL MEDICINE

## 2022-08-18 RX ORDER — CHLORAL HYDRATE 500 MG
2000 CAPSULE ORAL 2 TIMES DAILY
Qty: 240 CAPSULE | Refills: 3 | Status: SHIPPED | OUTPATIENT
Start: 2022-08-18

## 2022-08-18 NOTE — PROGRESS NOTES
Consultation - Cardiology Office  [facility]  Tank Alvarez 37 y o  female MRN: [id number]  : 1980  Unit/Bed#:  Encounter: [id number]      ASSESSMENT:  Dyspnea on exertion    Dizziness    Hypertension   Well controlled  currently on metoprolol    Mixed hyperlipidemia  2022:  , , HDL 49, normal AST and ALT    Mitral regurgitation, mild to moderate    TTE, 2018:  EF 60-65%  Mild to moderate mitral regurgitation  Trace tricuspid and pulmonic regurgitation      RECOMMENDATIONS:  Fish oil 2 g twice a day  Continue metoprolol and Crestor  Recheck lipid profile and LFTs in 2 months  Echocardiogram to reassess LV function and mitral regurgitation          Thank you for your consultation  If you have any question please call me at [phone]      Primary Care Physician Requesting Consult: Neena Odell MD      Reason for Consult / Principal Problem:  Mitral regurgitation        HPI :  2022:  Cardiology consult:   Tank Alvarez is a 37y o  year old female who was referred by primary care doctor for evaluation of previously noted mild to moderate mitral regurgitation  Patient complains of exertional dyspnea  She denies any chest pain or syncope  She had 1 episode of dizziness in the morning which she thought was due to Crestor  She also has hypertension which is well controlled with Toprol  Her recent labs revealed mixed hyperlipidemia and she has been started on Crestor  Her triglycerides are also significantly elevated at 327, therefore I am starting her on fish oil  We will reassess her lipid profile and LFTs in 2 months as already ordered by PCP  Review of Systems   Respiratory: Positive for shortness of breath  Neurological: Positive for dizziness  All other systems reviewed and are negative        Historical Information   Past Medical History:   Diagnosis Date    Hypertension     Mitral stenosis      Past Surgical History: Procedure Laterality Date    KNEE SURGERY       Social History     Substance and Sexual Activity   Alcohol Use Never    Comment: none     Social History     Substance and Sexual Activity   Drug Use Never     Social History     Tobacco Use   Smoking Status Never Smoker   Smokeless Tobacco Never Used     Family History:   Family History   Problem Relation Age of Onset    Heart disease Father     Diabetes Father     Stomach cancer Brother     Hypertension Mother        Meds/Allergies     No Known Allergies    Current Outpatient Medications:     metoprolol succinate (TOPROL-XL) 100 mg 24 hr tablet, Take 1 tablet (100 mg total) by mouth daily, Disp: 90 tablet, Rfl: 3    Omega-3 Fatty Acids (fish oil) 1,000 mg, Take 2 capsules (2,000 mg total) by mouth 2 (two) times a day, Disp: 240 capsule, Rfl: 3    rosuvastatin (CRESTOR) 10 MG tablet, Take 1 tablet (10 mg total) by mouth daily, Disp: 30 tablet, Rfl: 5    sucralfate (CARAFATE) 1 g tablet, Take 1 g by mouth 4 (four) times a day, Disp: , Rfl:     Vitals: Blood pressure 118/80, pulse 72, temperature 97 8 °F (36 6 °C), height 5' 2" (1 575 m), weight 60 8 kg (134 lb), SpO2 98 %  ?  Body mass index is 24 51 kg/m²  Vitals:    08/18/22 1036   Weight: 60 8 kg (134 lb)     BP Readings from Last 3 Encounters:   08/18/22 118/80   08/01/22 130/70   07/21/22 141/66       Physical Exam  PHYSICAL EXAMINATION:  Neurologic:  Alert & oriented x 3, no new focal deficits, Not in any acute distress,  Constitutional:  Well developed, well nourished, non-toxic appearance   Eyes:  Pupil equal and reacting to light, conjunctiva normal, No JVP, No LNP   HENT:  Atraumatic, oropharynx moist, Neck- normal range of motion, no tenderness,  Neck supple   Respiratory:  Bilateral air entry, mostly clear to auscultation  Cardiovascular: S1-S2 regular with a I/VI systolic murmur   GI:  Soft, nondistended, normal bowel sounds, nontender, no hepatosplenomegaly appreciated    Musculoskeletal: no tenderness, no deformities  Skin:  Well hydrated, no rash   Lymphatic:  No lymphadenopathy noted   Extremities:  No edema and distal pulses are present    Diagnostic Studies Review Cardio:      EKG:  Normal sinus rhythm, heart rate 72 per Min    Cardiac testing:   No results found for this or any previous visit  Imaging:  Chest X-Ray:   No Chest XR results available for this patient  CT-scan of the chest:     No CTA results available for this patient    Lab Review   Lab Results   Component Value Date    WBC 5 28 08/04/2022    HGB 14 1 08/04/2022    HCT 41 7 08/04/2022    MCV 97 08/04/2022    RDW 11 9 08/04/2022     08/04/2022     BMP:  Lab Results   Component Value Date    SODIUM 136 08/04/2022    K 3 7 08/04/2022     08/04/2022    CO2 26 08/04/2022    BUN 9 08/04/2022    CREATININE 0 72 08/04/2022    GLUC 187 (H) 01/15/2021    GLUF 78 08/04/2022    CALCIUM 8 9 08/04/2022    EGFR 105 08/04/2022    MG 2 1 01/15/2021     LFT:  Lab Results   Component Value Date    AST 18 08/04/2022    ALT 14 08/04/2022    ALKPHOS 64 08/04/2022    TP 7 1 08/04/2022    ALB 4 0 08/04/2022      No results found for: [id number]  No components found for: [employer] [facility]  Lab Results   Component Value Date    HGBA1C 5 1 03/11/2021     Lipid Profile:   Lab Results   Component Value Date    CHOLESTEROL 244 (H) 08/04/2022    HDL 49 (L) 08/04/2022    LDLCALC 130 (H) 08/04/2022    TRIG 327 (H) 08/04/2022     Lab Results   Component Value Date    CHOLESTEROL 244 (H) 08/04/2022    CHOLESTEROL 238 (H) 08/28/2021     No results found for: CKTOTAL, CKMB, CKMBINDEX, TROPONINI  No results found for: NTBNP   Recent Results (from the past 672 hour(s))   Lipid panel    Collection Time: 08/04/22 10:01 AM   Result Value Ref Range    Cholesterol 244 (H) See Comment mg/dL    Triglycerides 327 (H) See Comment mg/dL    HDL, Direct 49 (L) >=50 mg/dL    LDL Calculated 130 (H) 0 - 100 mg/dL    Non-HDL-Chol (CHOL-HDL) 195 mg/dl   Comprehensive metabolic panel    Collection Time: 08/04/22 10:01 AM   Result Value Ref Range    Sodium 136 135 - 147 mmol/L    Potassium 3 7 3 5 - 5 3 mmol/L    Chloride 102 96 - 108 mmol/L    CO2 26 21 - 32 mmol/L    ANION GAP 8 4 - 13 mmol/L    BUN 9 5 - 25 mg/dL    Creatinine 0 72 0 60 - 1 30 mg/dL    Glucose, Fasting 78 65 - 99 mg/dL    Calcium 8 9 8 4 - 10 2 mg/dL    AST 18 13 - 39 U/L    ALT 14 7 - 52 U/L    Alkaline Phosphatase 64 34 - 104 U/L    Total Protein 7 1 6 4 - 8 4 g/dL    Albumin 4 0 3 5 - 5 0 g/dL    Total Bilirubin 0 90 0 20 - 1 00 mg/dL    eGFR 105 ml/min/1 73sq m   CBC and differential    Collection Time: 08/04/22 10:01 AM   Result Value Ref Range    WBC 5 28 4 31 - 10 16 Thousand/uL    RBC 4 29 3 81 - 5 12 Million/uL    Hemoglobin 14 1 11 5 - 15 4 g/dL    Hematocrit 41 7 34 8 - 46 1 %    MCV 97 82 - 98 fL    MCH 32 9 26 8 - 34 3 pg    MCHC 33 8 31 4 - 37 4 g/dL    RDW 11 9 11 6 - 15 1 %    MPV 11 7 8 9 - 12 7 fL    Platelets 203 908 - 684 Thousands/uL    nRBC 0 /100 WBCs    Neutrophils Relative 66 43 - 75 %    Immat GRANS % 0 0 - 2 %    Lymphocytes Relative 25 14 - 44 %    Monocytes Relative 7 4 - 12 %    Eosinophils Relative 1 0 - 6 %    Basophils Relative 1 0 - 1 %    Neutrophils Absolute 3 50 1 85 - 7 62 Thousands/µL    Immature Grans Absolute 0 01 0 00 - 0 20 Thousand/uL    Lymphocytes Absolute 1 33 0 60 - 4 47 Thousands/µL    Monocytes Absolute 0 36 0 17 - 1 22 Thousand/µL    Eosinophils Absolute 0 03 0 00 - 0 61 Thousand/µL    Basophils Absolute 0 05 0 00 - 0 10 Thousands/µL   Hepatitis C antibody    Collection Time: 08/04/22 10:01 AM   Result Value Ref Range    Hepatitis C Ab Non-reactive Non-reactive           Dr Anish Mejia MD, [facility] [place]      "This note has been constructed using a voice recognition system  Therefore there may be syntax, spelling, and/or grammatical errors   Please call if you have any questions  "

## 2022-08-22 DIAGNOSIS — N30.90 CYSTITIS: Primary | ICD-10-CM

## 2022-08-22 RX ORDER — NITROFURANTOIN 25; 75 MG/1; MG/1
100 CAPSULE ORAL 2 TIMES DAILY
Qty: 14 CAPSULE | Refills: 0 | Status: SHIPPED | OUTPATIENT
Start: 2022-08-22 | End: 2022-08-29

## 2022-08-30 ENCOUNTER — OFFICE VISIT (OUTPATIENT)
Dept: PODIATRY | Facility: CLINIC | Age: 42
End: 2022-08-30
Payer: COMMERCIAL

## 2022-08-30 VITALS
HEART RATE: 98 BPM | HEIGHT: 62 IN | SYSTOLIC BLOOD PRESSURE: 118 MMHG | WEIGHT: 134.8 LBS | BODY MASS INDEX: 24.8 KG/M2 | DIASTOLIC BLOOD PRESSURE: 80 MMHG

## 2022-08-30 DIAGNOSIS — L60.0 INGROWN TOENAIL: Primary | ICD-10-CM

## 2022-08-30 PROCEDURE — 99203 OFFICE O/P NEW LOW 30 MIN: CPT | Performed by: PODIATRIST

## 2022-08-30 NOTE — PROGRESS NOTES
Assessment/Plan:    The lateral nail border of the left hallux was curetted out of callus skin and lytic nail  The medial and lateral nail borders were resected with a slant back procedure on the right hallux nail  She will monitor closely for recurrence of her ingrown nails on the right side  If should return we will proceed with a matrixectomy of the lateral border possibly the medial border as well  She will follow-up as needed  Diagnoses and all orders for this visit:    Ingrown toenail          Subjective:      Patient ID: Tank Alvarez is a 37 y o  female  The patient presents today for evaluation a chronic ingrown toenail on her right great toe  At present she is having minimal discomfort but still notes some mild discomfort to the outside of her left great toenail where a permanent nail procedure was performed by another local podiatrist back in June  She states that she was recently treated with a course of oral antibiotic therapy which helped to reduce a lot of her pain and swelling in the left great toe  The following portions of the patient's history were reviewed and updated as appropriate: allergies, current medications, past family history, past medical history, past social history, past surgical history and problem list       PAST MEDICAL HISTORY:  Past Medical History:   Diagnosis Date    Hypertension     Mitral stenosis        PAST SURGICAL HISTORY:  Past Surgical History:   Procedure Laterality Date    KNEE SURGERY          ALLERGIES:  Patient has no known allergies      MEDICATIONS:  Current Outpatient Medications   Medication Sig Dispense Refill    metoprolol succinate (TOPROL-XL) 100 mg 24 hr tablet Take 1 tablet (100 mg total) by mouth daily 90 tablet 3    Omega-3 Fatty Acids (fish oil) 1,000 mg Take 2 capsules (2,000 mg total) by mouth 2 (two) times a day 240 capsule 3    rosuvastatin (CRESTOR) 10 MG tablet Take 1 tablet (10 mg total) by mouth daily 30 tablet 5  sucralfate (CARAFATE) 1 g tablet Take 1 g by mouth 4 (four) times a day (Patient not taking: Reported on 8/30/2022)       No current facility-administered medications for this visit  SOCIAL HISTORY:  Social History     Socioeconomic History    Marital status: /Civil Union     Spouse name: None    Number of children: None    Years of education: None    Highest education level: None   Occupational History    None   Tobacco Use    Smoking status: Never Smoker    Smokeless tobacco: Never Used   Vaping Use    Vaping Use: Never used   Substance and Sexual Activity    Alcohol use: Never     Comment: none    Drug use: Never    Sexual activity: Yes     Partners: Male   Other Topics Concern    None   Social History Narrative    None     Social Determinants of Health     Financial Resource Strain: Not on file   Food Insecurity: Not on file   Transportation Needs: Not on file   Physical Activity: Not on file   Stress: Not on file   Social Connections: Not on file   Intimate Partner Violence: Not on file   Housing Stability: Not on file        Review of Systems   Constitutional: Negative for chills and fever  HENT: Negative for ear pain and sore throat  Eyes: Negative for pain and visual disturbance  Respiratory: Negative for cough and shortness of breath  Cardiovascular: Negative for chest pain and palpitations  Gastrointestinal: Negative for abdominal pain and vomiting  Genitourinary: Negative for dysuria and hematuria  Musculoskeletal: Negative for arthralgias and back pain  Skin: Negative for color change and rash  Neurological: Negative for seizures and syncope  Psychiatric/Behavioral: Negative  All other systems reviewed and are negative  Objective:      /80   Pulse 98   Ht 5' 2" (1 575 m) Comment: verbal  Wt 61 1 kg (134 lb 12 8 oz)   BMI 24 66 kg/m²          Physical Exam  Constitutional:       Appearance: Normal appearance     HENT:      Head: Normocephalic and atraumatic  Nose: Nose normal    Pulmonary:      Effort: Pulmonary effort is normal    Feet:      Comments: The patient is status post prior matrixectomy of the lateral nail border; there is some mild residual tenderness to palpation along the lateral nail fold where there is impaction of callus tissue and lytic nail material; there are no signs of any acute infectious process noted today    The right hallucal nail plate shows minimally incurvated nail borders at present with no significant tenderness to palpation; I would not recommend any permanent procedure be performed today as it does not appear to be clinically warranted at this time  Skin:     General: Skin is warm  Capillary Refill: Capillary refill takes less than 2 seconds  Neurological:      General: No focal deficit present  Mental Status: She is alert and oriented to person, place, and time  Psychiatric:         Mood and Affect: Mood normal          Behavior: Behavior normal          Thought Content:  Thought content normal

## 2022-09-01 ENCOUNTER — HOSPITAL ENCOUNTER (OUTPATIENT)
Dept: NON INVASIVE DIAGNOSTICS | Facility: CLINIC | Age: 42
Discharge: HOME | End: 2022-09-01
Payer: COMMERCIAL

## 2022-09-01 VITALS
SYSTOLIC BLOOD PRESSURE: 118 MMHG | HEART RATE: 68 BPM | HEIGHT: 62 IN | BODY MASS INDEX: 24.66 KG/M2 | DIASTOLIC BLOOD PRESSURE: 80 MMHG | WEIGHT: 134 LBS

## 2022-09-01 DIAGNOSIS — I34.2 NONRHEUMATIC MITRAL VALVE STENOSIS: ICD-10-CM

## 2022-09-01 LAB
AORTIC ROOT: 2.4 CM
APICAL FOUR CHAMBER EJECTION FRACTION: 62 %
ASCENDING AORTA: 2.5 CM
E WAVE DECELERATION TIME: 199 MS
FRACTIONAL SHORTENING: 37 % (ref 28–44)
INTERVENTRICULAR SEPTUM IN DIASTOLE (PARASTERNAL SHORT AXIS VIEW): 0.7 CM
INTERVENTRICULAR SEPTUM: 0.7 CM (ref 0.6–1.1)
LAAS-AP2: 11 CM2
LAAS-AP4: 13.9 CM2
LEFT ATRIUM SIZE: 3.3 CM
LEFT INTERNAL DIMENSION IN SYSTOLE: 2.9 CM (ref 2.1–4)
LEFT VENTRICULAR INTERNAL DIMENSION IN DIASTOLE: 4.6 CM (ref 3.5–6)
LEFT VENTRICULAR POSTERIOR WALL IN END DIASTOLE: 0.6 CM
LEFT VENTRICULAR STROKE VOLUME: 64 ML
LVSV (TEICH): 64 ML
MV E'TISSUE VEL-SEP: 10 CM/S
MV PEAK A VEL: 0.46 M/S
MV PEAK E VEL: 89 CM/S
MV STENOSIS PRESSURE HALF TIME: 58 MS
MV VALVE AREA P 1/2 METHOD: 3.79 CM2
RIGHT ATRIUM AREA SYSTOLE A4C: 10.2 CM2
RIGHT VENTRICLE ID DIMENSION: 2.4 CM
SL CV LEFT ATRIUM LENGTH A2C: 3.9 CM
SL CV LV EF: 55
SL CV PED ECHO LEFT VENTRICLE DIASTOLIC VOLUME (MOD BIPLANE) 2D: 96 ML
SL CV PED ECHO LEFT VENTRICLE SYSTOLIC VOLUME (MOD BIPLANE) 2D: 31 ML

## 2022-09-01 PROCEDURE — 93306 TTE W/DOPPLER COMPLETE: CPT

## 2022-09-01 PROCEDURE — 93306 TTE W/DOPPLER COMPLETE: CPT | Performed by: INTERNAL MEDICINE

## 2022-09-02 ENCOUNTER — TELEPHONE (OUTPATIENT)
Dept: CARDIOLOGY CLINIC | Facility: CLINIC | Age: 42
End: 2022-09-02

## 2022-09-02 NOTE — TELEPHONE ENCOUNTER
----- Message from Julian Casillas MD sent at 9/2/2022  9:03 AM EDT -----  Please call and inform the patient that the Echocardiogram showed normal pumping function of the heart      There is mild mitral regurgitation seen  Will discuss further at next office visit

## 2022-09-22 ENCOUNTER — OFFICE VISIT (OUTPATIENT)
Dept: FAMILY MEDICINE CLINIC | Facility: CLINIC | Age: 42
End: 2022-09-22
Payer: COMMERCIAL

## 2022-09-22 VITALS
TEMPERATURE: 97.2 F | OXYGEN SATURATION: 99 % | SYSTOLIC BLOOD PRESSURE: 144 MMHG | BODY MASS INDEX: 24.66 KG/M2 | HEART RATE: 76 BPM | RESPIRATION RATE: 16 BRPM | DIASTOLIC BLOOD PRESSURE: 88 MMHG | HEIGHT: 62 IN | WEIGHT: 134 LBS

## 2022-09-22 DIAGNOSIS — I10 ESSENTIAL HYPERTENSION: ICD-10-CM

## 2022-09-22 DIAGNOSIS — R00.2 PALPITATIONS: ICD-10-CM

## 2022-09-22 DIAGNOSIS — F41.9 ANXIETY AND DEPRESSION: Primary | ICD-10-CM

## 2022-09-22 DIAGNOSIS — R42 DIZZINESS: ICD-10-CM

## 2022-09-22 DIAGNOSIS — F32.A ANXIETY AND DEPRESSION: Primary | ICD-10-CM

## 2022-09-22 PROCEDURE — 3725F SCREEN DEPRESSION PERFORMED: CPT | Performed by: FAMILY MEDICINE

## 2022-09-22 PROCEDURE — 99214 OFFICE O/P EST MOD 30 MIN: CPT | Performed by: FAMILY MEDICINE

## 2022-09-22 RX ORDER — ESCITALOPRAM OXALATE 10 MG/1
10 TABLET ORAL DAILY
Qty: 30 TABLET | Refills: 6 | Status: SHIPPED | OUTPATIENT
Start: 2022-09-22

## 2022-09-22 NOTE — LETTER
September 22, 2022     Patient: Azra Leigh  YOB: 1980  Date of Visit: 9/22/2022      To Whom it May Concern:    Azra Leigh is under my professional care  Rekha Melyssa was seen in my office on 9/22/2022  Arvmary Ragsdale may return to work on 11/24/22  Medically excused  from work 9/23/22-11/23/22       If you have any questions or concerns, please don't hesitate to call           Sincerely,          Noelle Coles MD        CC: No Recipients

## 2022-09-22 NOTE — PROGRESS NOTES
Name: Lurdes Krishnan      : 1980      MRN: [id number]  Encounter Provider: Bandar Shanks MD  Encounter Date: 2022   Encounter department: [address] [place] [address] Dr MEDICINE    Assessment & Plan     1  Anxiety and depression  Assessment & Plan:  Pt is very overwhelmed depressed crying very anxious not suicidal not homicidal will need medication will start lexapro side effecst and warnings given;  followup 3 -4 weeks will need to  Keep pt out of work - rtw 22   At this time pts and providers are unable to find open psychiatiric appts or therapy appts  so will treat and monitor pt here      Orders:  -     escitalopram (LEXAPRO) 10 mg tablet; Take 1 tablet (10 mg total) by mouth daily    2  Essential hypertension  Assessment & Plan:  High today pt very anxious will recheck 3-4 weeks      3  Dizziness  Assessment & Plan:  appararently gets episodes with stress may be related to panic attacks      4  Palpitations  Assessment & Plan:  Has had more episodes recently, is on metoprolol should improve when stress reduced  Reviewed cardiology consult and echo             Subjective      HPI pt is having symptoms of depression and anxiety for several months pt has had many worries and health issues and overwhelmed at work has had heavy menses since then and  palpitations seeing cardiologist worsened with anxiety has dizzy spells also worsened with anxiety  Daughter I s ill with chronic gastritis and seeing dr  often  pts  now working at night and daughter has to stay with mother which is very stressful for her   Has crying spells and poor sleep  And panic attacks   Review of Systems   Cardiovascular: Positive for palpitations  Neurological: Positive for light-headedness  Psychiatric/Behavioral: Positive for dysphoric mood and sleep disturbance  Negative for self-injury and suicidal ideas  The patient is nervous/anxious          Current Outpatient Medications on File Prior to Visit Medication Sig    metoprolol succinate (TOPROL-XL) 100 mg 24 hr tablet Take 1 tablet (100 mg total) by mouth daily    Omega-3 Fatty Acids (fish oil) 1,000 mg Take 2 capsules (2,000 mg total) by mouth 2 (two) times a day    rosuvastatin (CRESTOR) 10 MG tablet Take 1 tablet (10 mg total) by mouth daily    [DISCONTINUED] sucralfate (CARAFATE) 1 g tablet Take 1 g by mouth 4 (four) times a day (Patient not taking: No sig reported)       Objective     /88 (BP Location: Left arm, Patient Position: Sitting, Cuff Size: Standard)   Pulse 76   Temp (!) 97 2 °F (36 2 °C) (Temporal)   Resp 16   Ht 5' 2" (1 575 m)   Wt 60 8 kg (134 lb)   SpO2 99%   BMI 24 51 kg/m²     Physical Exam  Constitutional:       General: She is not in acute distress  Appearance: Normal appearance  She is well-developed  She is obese  She is not ill-appearing  Eyes:      Extraocular Movements: Extraocular movements intact  Pupils: Pupils are equal, round, and reactive to light  Cardiovascular:      Rate and Rhythm: Normal rate and regular rhythm  Pulses: Normal pulses  Heart sounds: Normal heart sounds  No murmur heard  Pulmonary:      Effort: Pulmonary effort is normal       Breath sounds: Normal breath sounds  Musculoskeletal:      Right lower leg: No edema  Left lower leg: No edema  Neurological:      General: No focal deficit present  Mental Status: She is alert and oriented to person, place, and time  Mental status is at baseline  Psychiatric:         Thought Content:  Thought content normal       Comments: Teary and very anxious in appearance  Nl affect        Olivia Gtz MD

## 2022-09-22 NOTE — ASSESSMENT & PLAN NOTE
Has had more episodes recently, is on metoprolol should improve when stress reduced  Reviewed cardiology consult and echo

## 2022-09-22 NOTE — ASSESSMENT & PLAN NOTE
Pt is very overwhelmed depressed crying very anxious not suicidal not homicidal will need medication will start lexapro side effecst and warnings given;  followup 3 -4 weeks will need to  Keep pt out of work 9/23-11/23 rtw 11/24/22   At this time pts and providers are unable to find open psychiatiric appts or therapy appts  so will treat and monitor pt here

## 2022-09-26 ENCOUNTER — TELEPHONE (OUTPATIENT)
Dept: FAMILY MEDICINE CLINIC | Facility: CLINIC | Age: 42
End: 2022-09-26

## 2022-09-28 ENCOUNTER — OFFICE VISIT (OUTPATIENT)
Dept: OBGYN CLINIC | Facility: CLINIC | Age: 42
End: 2022-09-28
Payer: COMMERCIAL

## 2022-09-28 VITALS
SYSTOLIC BLOOD PRESSURE: 118 MMHG | HEIGHT: 62 IN | DIASTOLIC BLOOD PRESSURE: 82 MMHG | BODY MASS INDEX: 24.73 KG/M2 | WEIGHT: 134.4 LBS

## 2022-09-28 DIAGNOSIS — N92.0 MENORRHAGIA WITH REGULAR CYCLE: ICD-10-CM

## 2022-09-28 DIAGNOSIS — N92.1 PROLONGED MENSTRUATION: Primary | ICD-10-CM

## 2022-09-28 LAB — SL AMB POCT URINE HCG: NEGATIVE

## 2022-09-28 PROCEDURE — 99202 OFFICE O/P NEW SF 15 MIN: CPT | Performed by: PHYSICIAN ASSISTANT

## 2022-09-28 PROCEDURE — 81025 URINE PREGNANCY TEST: CPT | Performed by: PHYSICIAN ASSISTANT

## 2022-09-28 RX ORDER — NORETHINDRONE ACETATE AND ETHINYL ESTRADIOL 1MG-20(21)
KIT ORAL
Qty: 28 TABLET | Refills: 0 | Status: SHIPPED | OUTPATIENT
Start: 2022-09-28 | End: 2022-10-28

## 2022-09-28 NOTE — PROGRESS NOTES
Assessment/Plan:     Diagnoses and all orders for this visit:    Prolonged menstruation  -     POCT urine HCG    Menorrhagia with regular cycle  -     US pelvis complete w transvaginal; Future  -     norethindrone-ethinyl estradiol (Junel FE 1/20) 1-20 MG-MCG per tablet; Take 2 pills PO daily x 3 days, then 1 pill PO daily until pack is finished       41y/o F new patient to our office today with hx of normal periods, hx of IVF x 1 successful pregnancy 10 yrs ago, nonspecific pelvic surgery prior for unspecified ovarian abnormality, presenting today for concerned of prolonged menstruation this time as in HPI  Slightly heavier than baseline and lasting longer than expected  Discussed possible causes to prolonged menstruation  Certainly increased stress could be playing a role  POC HCG was negative today  Will have pt obtain pelvic US to r/o adenomyosis, fibroids, etc  This is very bothersome and concerning for pt and wishes for help w/ bleeding at this time  Discuss risks and benefits and recommendations for use for options such as TXA, Ibuprofen, doxycycline and OCP x 1 cycle with shared decision-making  Pt wishes to trial OCP after discussion  Pt has hx of HTN and palpitations, taking BB and BP has been excellent and remained stable  Potential side effects of OCP discussed w/ pt including risk for blood clots and BP problems which pt expresses understanding  Will have pt take 2 tabs PO daily x 3-5 days then 1 pill daily until pack completed to help stop bleeding and reset cycle  She will f/u with me in 6-8 and will also do annual/PAP at that time  Will call sooner with pelvic US results  If heavy bleeding/prolonged menstruation continues, will need to consider further workup with BW and possible EMBx w/ Dr Román Willard  Certainly will need to address abnormals if found on pelvic US    Also discussed even though pt has had difficulty getting pregnant in past, would still recommend contraception as she is still getting menses  Pt understands and agreeable to plan, will contact sooner if still having problems with bleeding  Chief Complaint   Patient presents with    abnormal menses     Has menses for 13 days , some cramping       Subjective:      Patient ID: Mickey Frey is a 40 y o  female  43y/o female here today for concern prolonged period x 11 days, LMP was 9/17  States period came when due, but lasting much longer and heaviness lasting longer than it has with all of her prior periods  states period prior to this were all normal    States came every 30 days, regular  Lasted about 7-8 days  States periods previous were typically heavier 2nd and 3rd day then tapered  Now period is heavier and not tapering as expected, changing pad or tampon every 1-2 hrs but not saturating through  States slight cramping, none currently  Pt does report being under a lot of stress lately, just saw PCP 2 days ago and started on Lexparo  1 pregnancy with IVF  10 years ago  States had surgery prior to IVF to remove something from the ovaries (fibrosis)  States chances of pregnancy is low, but is not using any current contraception  She denies personal or FHx of thyroid disease  Denies any nosebleeds, easy bleeding/bruising or gum bleeding, or bleed  Denies FHx of know bleeding d/o or clotting d/o  Denies tobacco use or migraine w/ aura  She has HTN and palpitations, recent normal echo  Taking metoprolol for HTN and palpitations, seeing cardiology  BP has been stable  She reports having last GYN in [facility] > 1 year ago  Denies hx of abnormal PAP  Monogamous sexual relationship  She denies generalized pelvic pain, bloating, urinary issues or vaginal concerns        The following portions of the patient's history were reviewed and updated as appropriate: allergies, current medications, past family history, past medical history, past social history, past surgical history and problem list     Review of Systems   Constitutional: Negative  Respiratory: Negative  Cardiovascular: Negative  Negative for chest pain and palpitations  Gastrointestinal: Negative  Genitourinary:        As in HPI   Neurological: Negative  Psychiatric/Behavioral: The patient is nervous/anxious (reports significant increased stress)  Objective:      /82 (BP Location: Left arm, Patient Position: Sitting, Cuff Size: Standard)   Ht 5' 2" (1 575 m)   Wt 61 kg (134 lb 6 4 oz)   LMP 09/17/2022   BMI 24 58 kg/m²          Physical Exam  Vitals reviewed  Constitutional:       General: She is not in acute distress  Appearance: Normal appearance  She is not ill-appearing  Neck:      Thyroid: No thyromegaly or thyroid tenderness  Cardiovascular:      Rate and Rhythm: Normal rate and regular rhythm  Heart sounds: Normal heart sounds  Pulmonary:      Effort: Pulmonary effort is normal       Breath sounds: Normal breath sounds  Genitourinary:     Comments: GYN exam today not indicated  Musculoskeletal:      Cervical back: Neck supple  Neurological:      Mental Status: She is alert and oriented to person, place, and time  Psychiatric:         Mood and Affect: Mood normal          Behavior: Behavior normal  Behavior is cooperative

## 2022-10-03 ENCOUNTER — TELEPHONE (OUTPATIENT)
Dept: OBGYN CLINIC | Facility: CLINIC | Age: 42
End: 2022-10-03

## 2022-10-03 NOTE — TELEPHONE ENCOUNTER
Please call pt - I spoke with dr Chrissie Moreira regarding her case  She may have already started the oral contraceptive medication that I prescribed for her on Friday to help control and stop her ongoing menstruation  Dr Chrissie Moreira recommends that we use progestin only OCP instead secondary to her having hypertension, even though it has been well controlled  Even though this medication is only for a month to see if it helps to stop the bleeding and reset her cycle, she still would advise no estrogen use because of her history of HTN and switch to the progestin only type x  Month  If pt agreeable to this, she can stop the combo OCP and switch right into the norethindrone progestin only pill the next day  If her bleeding has stopped completely since seeing me and taking the combo OCP x 2-3 days already she can stop it all together and see how she does without anything since this is the first time she has had prolonged bleeding  If it happens again, however, would recommend f/u with dr Chrissie Moreira as she should have tissue sample of lining of uterus for abnormal bleeding  Please also make sure pt has pelvic US scheduled  Please update me and send task back to me   TY

## 2022-10-04 NOTE — TELEPHONE ENCOUNTER
I spoke with pt directly when she called back  She notes bleeding has stopped ans has not had any issues  Today is day 6 of OCP  Advised to take x 1 more day for total of 1 week, last dose tomorrow then d/c and see how she does with next menstruation  Reminded pt also still would like her to get her pelvic US done  Pt is interested in something for contraception, which in that event progestin only would be appropriate  Briefly discussed depo, IUD, nexplanon, progestin only OCP  Pt prefers pill  Would advise starting first day of next menstruation, though pt wishes to se how she does first with pelvic US and next menstruation  Will reach out after receiving US results to confirm plan with pt   She is agreeable to this,

## 2022-10-13 ENCOUNTER — OFFICE VISIT (OUTPATIENT)
Dept: FAMILY MEDICINE CLINIC | Facility: CLINIC | Age: 42
End: 2022-10-13
Payer: COMMERCIAL

## 2022-10-13 VITALS
TEMPERATURE: 97 F | SYSTOLIC BLOOD PRESSURE: 128 MMHG | RESPIRATION RATE: 16 BRPM | HEIGHT: 62 IN | HEART RATE: 53 BPM | WEIGHT: 133 LBS | BODY MASS INDEX: 24.48 KG/M2 | DIASTOLIC BLOOD PRESSURE: 82 MMHG | OXYGEN SATURATION: 99 %

## 2022-10-13 DIAGNOSIS — F32.A ANXIETY AND DEPRESSION: Primary | ICD-10-CM

## 2022-10-13 DIAGNOSIS — F41.9 ANXIETY AND DEPRESSION: Primary | ICD-10-CM

## 2022-10-13 DIAGNOSIS — Z23 IMMUNIZATION DUE: ICD-10-CM

## 2022-10-13 PROCEDURE — 90471 IMMUNIZATION ADMIN: CPT | Performed by: FAMILY MEDICINE

## 2022-10-13 PROCEDURE — 90686 IIV4 VACC NO PRSV 0.5 ML IM: CPT | Performed by: FAMILY MEDICINE

## 2022-10-13 PROCEDURE — 99214 OFFICE O/P EST MOD 30 MIN: CPT | Performed by: FAMILY MEDICINE

## 2022-10-13 NOTE — PROGRESS NOTES
Name: Rinku Macdonald      : 1980      MRN: [id number]  Encounter Provider: Latisha Davenport MD  Encounter Date: 10/13/2022   Encounter department: [address] [place] [address] Dr MEDICINE    Assessment & Plan     1  Anxiety and depression  Assessment & Plan:  Mild improvement only  In anxiety still with crying spells no t suicidal pt overwhelmed with worry about her daughters health both pt and daughter are seeing therapist  Pt has many dr appointments for her daughter Dahlia Sparks and therapy follow up 3 weeks       2  Immunization due  -     influenza vaccine, quadrivalent, 0 5 mL, preservative-free, for adult and pediatric patients 6 mos+ (AFLURIA, FLUARIX, FLULAVAL, FLUZONE)           Subjective      HPI     Review of Systems   Psychiatric/Behavioral: Positive for dysphoric mood  Negative for decreased concentration, self-injury and suicidal ideas  The patient is nervous/anxious  Current Outpatient Medications on File Prior to Visit   Medication Sig   • escitalopram (LEXAPRO) 10 mg tablet Take 1 tablet (10 mg total) by mouth daily   • metoprolol succinate (TOPROL-XL) 100 mg 24 hr tablet Take 1 tablet (100 mg total) by mouth daily   • norethindrone-ethinyl estradiol (Junel ) 1-20 MG-MCG per tablet Take 2 pills PO daily x 3 days, then 1 pill PO daily until pack is finished  • Omega-3 Fatty Acids (fish oil) 1,000 mg Take 2 capsules (2,000 mg total) by mouth 2 (two) times a day   • rosuvastatin (CRESTOR) 10 MG tablet Take 1 tablet (10 mg total) by mouth daily       Objective     /82 (BP Location: Left arm, Patient Position: Sitting, Cuff Size: Standard)   Pulse (!) 53   Temp (!) 97 °F (36 1 °C) (Temporal)   Resp 16   Ht 5' 2" (1 575 m)   Wt 60 3 kg (133 lb)   LMP 2022   SpO2 99%   BMI 24 33 kg/m²     Physical Exam  Constitutional:       General: She is not in acute distress  Appearance: Normal appearance  She is well-developed  She is not ill-appearing     Eyes: Extraocular Movements: Extraocular movements intact  Neck:      Thyroid: No thyromegaly  Cardiovascular:      Rate and Rhythm: Normal rate  Pulmonary:      Effort: Pulmonary effort is normal  No respiratory distress  Breath sounds: Normal breath sounds  Musculoskeletal:      Cervical back: Normal range of motion  Neurological:      General: No focal deficit present  Mental Status: She is alert and oriented to person, place, and time  Mental status is at baseline     Psychiatric:         Behavior: Behavior normal       Comments: Pt teary discussing daughters ill health and very worried     spent 45 minutes with pt  Audelia Little MD

## 2022-10-13 NOTE — ASSESSMENT & PLAN NOTE
Mild improvement only  In anxiety still with crying spells no t suicidal pt overwhelmed with worry about her daughters health both pt and daughter are seeing therapist  Pt has many dr appointments for her daughter Judit Gaytan and therapy follow up 3 weeks

## 2022-10-25 ENCOUNTER — HOSPITAL ENCOUNTER (OUTPATIENT)
Dept: ULTRASOUND IMAGING | Facility: HOSPITAL | Age: 42
Discharge: HOME | End: 2022-10-25
Payer: COMMERCIAL

## 2022-10-25 DIAGNOSIS — N92.0 MENORRHAGIA WITH REGULAR CYCLE: ICD-10-CM

## 2022-10-25 PROCEDURE — 76856 US EXAM PELVIC COMPLETE: CPT

## 2022-10-25 PROCEDURE — 76830 TRANSVAGINAL US NON-OB: CPT

## 2022-10-28 DIAGNOSIS — N80.03 ADENOMYOSIS OF UTERUS: ICD-10-CM

## 2022-10-28 DIAGNOSIS — N70.11 HYDROSALPINX: Primary | ICD-10-CM

## 2022-11-02 DIAGNOSIS — Z30.011 ORAL CONTRACEPTION INITIAL PRESCRIPTION: Primary | ICD-10-CM

## 2022-11-02 RX ORDER — ACETAMINOPHEN AND CODEINE PHOSPHATE 120; 12 MG/5ML; MG/5ML
1 SOLUTION ORAL DAILY
Qty: 84 TABLET | Refills: 1 | Status: SHIPPED | OUTPATIENT
Start: 2022-11-02 | End: 2023-05-16

## 2022-11-08 ENCOUNTER — OFFICE VISIT (OUTPATIENT)
Dept: CARDIOLOGY CLINIC | Facility: CLINIC | Age: 42
End: 2022-11-08

## 2022-11-08 VITALS
DIASTOLIC BLOOD PRESSURE: 90 MMHG | HEART RATE: 71 BPM | SYSTOLIC BLOOD PRESSURE: 132 MMHG | HEIGHT: 62 IN | TEMPERATURE: 97.8 F | WEIGHT: 134 LBS | BODY MASS INDEX: 24.66 KG/M2 | OXYGEN SATURATION: 100 %

## 2022-11-08 DIAGNOSIS — I10 ESSENTIAL HYPERTENSION: Primary | ICD-10-CM

## 2022-11-08 DIAGNOSIS — E78.2 MIXED HYPERLIPIDEMIA: ICD-10-CM

## 2022-11-08 NOTE — PROGRESS NOTES
Progress Note - Cardiology Office  [facility]    Chico Delaney 40 y o  female MRN: [id number]  : 1980  Encounter: [id number]      ASSESSMENT:  Hypertension   Generally controlled  currently on metoprolol     Mixed hyperlipidemia  2022:  , , HDL 49, normal AST and ALT  Patient did not get repeat lipid profile and LFTs that were due in October     Mitral regurgitation, mild to moderate    TTE, 2022:  EF 55%, mild MR     TTE, 2018:  EF 60-65%  Mild to moderate mitral regurgitation  Trace tricuspid and pulmonic regurgitation    Stress and anxiety        RECOMMENDATIONS:  Continue lipid-lowering medications and metoprolol  Lipid profile and LFTs  Will call with test results and recommendations once completed  Low-salt and low-cholesterol diet  Regular cardiovascular exercise for 20-30 minutes daily         Please call [phone] if any questions  HPI :     Chico Delaney is a 40y o  year old female who came for follow up  She has fair amount of stress and anxiety because of her daughter's health issues  She states that sometimes when she is lying in bed she feels some tingling and numbness in her hands and gets some sweating but none with exertion  She was supposed to have repeat lipid profile and LFTs prior to her office visit today which she did not  She is going to get labs tomorrow and we will call her with the results and recommendations    REVIEW OF SYSTEMS:  Denies any new or acute cardiac symptoms    Denies unusual dyspnea, chest pain, palpitations or syncope    Historical Information   Past Medical History:   Diagnosis Date   • Female infertility    • Hyperlipemia    • Hypertension    • Mitral stenosis      Past Surgical History:   Procedure Laterality Date   • KNEE SURGERY     • WISDOM TOOTH EXTRACTION       Social History     Substance and Sexual Activity   Alcohol Use Yes    Comment: none     Social History     Substance and Sexual Activity   Drug Use Never     Social History     Tobacco Use   Smoking Status Never Smoker   Smokeless Tobacco Never Used     Family History:   Family History   Problem Relation Age of Onset   • Heart disease Father    • Diabetes Father    • Stomach cancer Brother    • Hypertension Mother        Meds/Allergies     No Known Allergies    Current Outpatient Medications:   •  escitalopram (LEXAPRO) 10 mg tablet, Take 1 tablet (10 mg total) by mouth daily, Disp: 30 tablet, Rfl: 6  •  metoprolol succinate (TOPROL-XL) 100 mg 24 hr tablet, Take 1 tablet (100 mg total) by mouth daily, Disp: 90 tablet, Rfl: 3  •  norethindrone (Zelda) 0 35 MG tablet, Take 1 tablet (0 35 mg total) by mouth daily Take at the same time every day; avoid missing pills  , Disp: 84 tablet, Rfl: 1  •  Omega-3 Fatty Acids (fish oil) 1,000 mg, Take 2 capsules (2,000 mg total) by mouth 2 (two) times a day, Disp: 240 capsule, Rfl: 3  •  rosuvastatin (CRESTOR) 10 MG tablet, Take 1 tablet (10 mg total) by mouth daily, Disp: 30 tablet, Rfl: 5    Vitals: Blood pressure 132/90, pulse 71, temperature 97 8 °F (36 6 °C), height 5' 2" (1 575 m), weight 60 8 kg (134 lb), SpO2 100 %  ?  Body mass index is 24 51 kg/m²  Vitals:    11/08/22 1029   Weight: 60 8 kg (134 lb)     BP Readings from Last 3 Encounters:   11/08/22 132/90   10/13/22 128/82   09/28/22 118/82       Physical Exam:    Neurologic:  Alert & oriented x 3, no new focal deficits, appears anxious  Constitutional:  Well developed, well nourished, non-toxic appearance   Eyes:  Pupil equal and reacting to light, conjunctiva normal,   HENT:  Atraumatic, oropharynx moist, Neck- normal range of motion, no tenderness,  Neck supple, No JVP, No LNP   Respiratory:  Bilateral air entry, mostly clear to auscultation  Cardiovascular: S1-S2 regular with a I/VI systolic murmur   GI:  Soft, nondistended, normal bowel sounds, nontender, no hepatosplenomegaly appreciated    Musculoskeletal:  No tenderness, no deformities  Skin:  Well hydrated, no rash   Lymphatic:  No lymphadenopathy noted   Extremities:  No edema and distal pulses are present    Cardiac testing:     Results for orders placed during the hospital encounter of 09/01/22    Echo complete w/ contrast if indicated    Interpretation Summary  •  Left Ventricle: Left ventricular cavity size is normal  Wall thickness is normal  The left ventricular ejection fraction is 55%  Systolic function is normal  Wall motion is normal  Diastolic function is normal   •  Right Ventricle: Right ventricular cavity size is normal  Systolic function is normal   •  Mitral Valve: There is mild regurgitation  Imaging:  Chest X-Ray:   No Chest XR results available for this patient  CT-scan of the chest:     No CTA results available for this patient    Lab Review   Lab Results   Component Value Date    WBC 5 28 08/04/2022    HGB 14 1 08/04/2022    HCT 41 7 08/04/2022    MCV 97 08/04/2022    RDW 11 9 08/04/2022     08/04/2022     BMP:  Lab Results   Component Value Date    SODIUM 136 08/04/2022    K 3 7 08/04/2022     08/04/2022    CO2 26 08/04/2022    BUN 9 08/04/2022    CREATININE 0 72 08/04/2022    GLUC 187 (H) 01/15/2021    GLUF 78 08/04/2022    CALCIUM 8 9 08/04/2022    EGFR 105 08/04/2022    MG 2 1 01/15/2021     LFT:  Lab Results   Component Value Date    AST 18 08/04/2022    ALT 14 08/04/2022    ALKPHOS 64 08/04/2022    TP 7 1 08/04/2022    ALB 4 0 08/04/2022      No components found for: TSH3  No results found for: [facility]  Lab Results   Component Value Date    HGBA1C 5 1 03/11/2021     Lipid Profile:   Lab Results   Component Value Date    CHOLESTEROL 244 (H) 08/04/2022    HDL 49 (L) 08/04/2022    LDLCALC 130 (H) 08/04/2022    TRIG 327 (H) 08/04/2022     Lab Results   Component Value Date    CHOLESTEROL 244 (H) 08/04/2022    CHOLESTEROL 238 (H) 08/28/2021     No results found for: CKTOTAL, CKMB, CKMBINDEX, TROPONINI  No results found for: NTBNP   No results found for this or any previous visit (from the past 672 hour(s))  Dr Marybel Looney MD, [address]      "This note has been constructed using a voice recognition system  Therefore there may be syntax, spelling, and/or grammatical errors   Please call if you have any questions  "

## 2022-11-09 ENCOUNTER — APPOINTMENT (OUTPATIENT)
Dept: LAB | Facility: HOSPITAL | Age: 42
End: 2022-11-09
Attending: FAMILY MEDICINE

## 2022-11-09 DIAGNOSIS — Z13.9 SCREENING FOR UNSPECIFIED CONDITION: Primary | ICD-10-CM

## 2022-11-09 LAB
ALBUMIN SERPL BCP-MCNC: 4.2 G/DL (ref 3.5–5)
ALP SERPL-CCNC: 61 U/L (ref 34–104)
ALT SERPL W P-5'-P-CCNC: 9 U/L (ref 7–52)
ANION GAP SERPL CALCULATED.3IONS-SCNC: 7 MMOL/L (ref 4–13)
AST SERPL W P-5'-P-CCNC: 14 U/L (ref 13–39)
BASOPHILS # BLD AUTO: 0.04 THOUSANDS/ÂΜL (ref 0–0.1)
BASOPHILS NFR BLD AUTO: 1 % (ref 0–1)
BILIRUB SERPL-MCNC: 0.72 MG/DL (ref 0.2–1)
BUN SERPL-MCNC: 10 MG/DL (ref 5–25)
CALCIUM SERPL-MCNC: 8.9 MG/DL (ref 8.4–10.2)
CHLORIDE SERPL-SCNC: 103 MMOL/L (ref 96–108)
CHOLEST SERPL-MCNC: 180 MG/DL
CO2 SERPL-SCNC: 27 MMOL/L (ref 21–32)
CREAT SERPL-MCNC: 0.75 MG/DL (ref 0.6–1.3)
EOSINOPHIL # BLD AUTO: 0.04 THOUSAND/ÂΜL (ref 0–0.61)
EOSINOPHIL NFR BLD AUTO: 1 % (ref 0–6)
ERYTHROCYTE [DISTWIDTH] IN BLOOD BY AUTOMATED COUNT: 11.9 % (ref 11.6–15.1)
GFR SERPL CREATININE-BSD FRML MDRD: 99 ML/MIN/1.73SQ M
GLUCOSE P FAST SERPL-MCNC: 85 MG/DL (ref 65–99)
HCT VFR BLD AUTO: 39.4 % (ref 34.8–46.1)
HCV AB SER QL: NORMAL
HDLC SERPL-MCNC: 50 MG/DL
HGB BLD-MCNC: 12.9 G/DL (ref 11.5–15.4)
IMM GRANULOCYTES # BLD AUTO: 0.02 THOUSAND/UL (ref 0–0.2)
IMM GRANULOCYTES NFR BLD AUTO: 0 % (ref 0–2)
LDLC SERPL CALC-MCNC: 88 MG/DL (ref 0–100)
LYMPHOCYTES # BLD AUTO: 1.43 THOUSANDS/ÂΜL (ref 0.6–4.47)
LYMPHOCYTES NFR BLD AUTO: 29 % (ref 14–44)
MCH RBC QN AUTO: 31.8 PG (ref 26.8–34.3)
MCHC RBC AUTO-ENTMCNC: 32.7 G/DL (ref 31.4–37.4)
MCV RBC AUTO: 97 FL (ref 82–98)
MONOCYTES # BLD AUTO: 0.37 THOUSAND/ÂΜL (ref 0.17–1.22)
MONOCYTES NFR BLD AUTO: 8 % (ref 4–12)
NEUTROPHILS # BLD AUTO: 3.04 THOUSANDS/ÂΜL (ref 1.85–7.62)
NEUTS SEG NFR BLD AUTO: 61 % (ref 43–75)
NONHDLC SERPL-MCNC: 130 MG/DL
NRBC BLD AUTO-RTO: 0 /100 WBCS
PLATELET # BLD AUTO: 279 THOUSANDS/UL (ref 149–390)
PMV BLD AUTO: 10.6 FL (ref 8.9–12.7)
POTASSIUM SERPL-SCNC: 4.2 MMOL/L (ref 3.5–5.3)
PROT SERPL-MCNC: 7.5 G/DL (ref 6.4–8.4)
RBC # BLD AUTO: 4.06 MILLION/UL (ref 3.81–5.12)
SODIUM SERPL-SCNC: 137 MMOL/L (ref 135–147)
TRIGL SERPL-MCNC: 211 MG/DL
WBC # BLD AUTO: 4.94 THOUSAND/UL (ref 4.31–10.16)

## 2022-11-15 ENCOUNTER — HOSPITAL ENCOUNTER (EMERGENCY)
Facility: HOSPITAL | Age: 42
Discharge: HOME | End: 2022-11-15
Attending: EMERGENCY MEDICINE

## 2022-11-15 VITALS
HEART RATE: 64 BPM | BODY MASS INDEX: 25.32 KG/M2 | TEMPERATURE: 98.1 F | DIASTOLIC BLOOD PRESSURE: 84 MMHG | OXYGEN SATURATION: 100 % | RESPIRATION RATE: 18 BRPM | WEIGHT: 138.45 LBS | SYSTOLIC BLOOD PRESSURE: 131 MMHG

## 2022-11-15 DIAGNOSIS — R51.9 ACUTE NONINTRACTABLE HEADACHE, UNSPECIFIED HEADACHE TYPE: Primary | ICD-10-CM

## 2022-11-15 LAB
ALBUMIN SERPL BCP-MCNC: 4.2 G/DL (ref 3.5–5)
ALP SERPL-CCNC: 68 U/L (ref 34–104)
ALT SERPL W P-5'-P-CCNC: 9 U/L (ref 7–52)
ANION GAP SERPL CALCULATED.3IONS-SCNC: 6 MMOL/L (ref 4–13)
AST SERPL W P-5'-P-CCNC: 14 U/L (ref 13–39)
BASOPHILS # BLD AUTO: 0.06 THOUSANDS/ÂΜL (ref 0–0.1)
BASOPHILS NFR BLD AUTO: 1 % (ref 0–1)
BILIRUB SERPL-MCNC: 0.58 MG/DL (ref 0.2–1)
BUN SERPL-MCNC: 14 MG/DL (ref 5–25)
CALCIUM SERPL-MCNC: 9 MG/DL (ref 8.4–10.2)
CARDIAC TROPONIN I PNL SERPL HS: <2 NG/L
CHLORIDE SERPL-SCNC: 102 MMOL/L (ref 96–108)
CO2 SERPL-SCNC: 29 MMOL/L (ref 21–32)
CREAT SERPL-MCNC: 1.26 MG/DL (ref 0.6–1.3)
EOSINOPHIL # BLD AUTO: 0.02 THOUSAND/ÂΜL (ref 0–0.61)
EOSINOPHIL NFR BLD AUTO: 0 % (ref 0–6)
ERYTHROCYTE [DISTWIDTH] IN BLOOD BY AUTOMATED COUNT: 11.9 % (ref 11.6–15.1)
GFR SERPL CREATININE-BSD FRML MDRD: 53 ML/MIN/1.73SQ M
GLUCOSE SERPL-MCNC: 90 MG/DL (ref 65–140)
HCT VFR BLD AUTO: 37.3 % (ref 34.8–46.1)
HGB BLD-MCNC: 12.3 G/DL (ref 11.5–15.4)
IMM GRANULOCYTES # BLD AUTO: 0.01 THOUSAND/UL (ref 0–0.2)
IMM GRANULOCYTES NFR BLD AUTO: 0 % (ref 0–2)
LYMPHOCYTES # BLD AUTO: 1.42 THOUSANDS/ÂΜL (ref 0.6–4.47)
LYMPHOCYTES NFR BLD AUTO: 23 % (ref 14–44)
MCH RBC QN AUTO: 32.4 PG (ref 26.8–34.3)
MCHC RBC AUTO-ENTMCNC: 33 G/DL (ref 31.4–37.4)
MCV RBC AUTO: 98 FL (ref 82–98)
MONOCYTES # BLD AUTO: 0.45 THOUSAND/ÂΜL (ref 0.17–1.22)
MONOCYTES NFR BLD AUTO: 7 % (ref 4–12)
NEUTROPHILS # BLD AUTO: 4.1 THOUSANDS/ÂΜL (ref 1.85–7.62)
NEUTS SEG NFR BLD AUTO: 69 % (ref 43–75)
NRBC BLD AUTO-RTO: 0 /100 WBCS
PLATELET # BLD AUTO: 303 THOUSANDS/UL (ref 149–390)
PMV BLD AUTO: 9.4 FL (ref 8.9–12.7)
POTASSIUM SERPL-SCNC: 4.1 MMOL/L (ref 3.5–5.3)
PROT SERPL-MCNC: 7.3 G/DL (ref 6.4–8.4)
RBC # BLD AUTO: 3.8 MILLION/UL (ref 3.81–5.12)
SODIUM SERPL-SCNC: 137 MMOL/L (ref 135–147)
WBC # BLD AUTO: 6.06 THOUSAND/UL (ref 4.31–10.16)

## 2022-11-15 RX ORDER — DEXAMETHASONE SODIUM PHOSPHATE 4 MG/ML
10 INJECTION, SOLUTION INTRA-ARTICULAR; INTRALESIONAL; INTRAMUSCULAR; INTRAVENOUS; SOFT TISSUE ONCE
Status: COMPLETED | OUTPATIENT
Start: 2022-11-15 | End: 2022-11-15

## 2022-11-15 RX ORDER — METOCLOPRAMIDE HYDROCHLORIDE 5 MG/ML
10 INJECTION INTRAMUSCULAR; INTRAVENOUS ONCE
Status: COMPLETED | OUTPATIENT
Start: 2022-11-15 | End: 2022-11-15

## 2022-11-15 RX ORDER — MAGNESIUM SULFATE HEPTAHYDRATE 40 MG/ML
2 INJECTION, SOLUTION INTRAVENOUS ONCE
Status: COMPLETED | OUTPATIENT
Start: 2022-11-15 | End: 2022-11-15

## 2022-11-15 RX ORDER — DIPHENHYDRAMINE HYDROCHLORIDE 50 MG/ML
25 INJECTION INTRAMUSCULAR; INTRAVENOUS ONCE
Status: COMPLETED | OUTPATIENT
Start: 2022-11-15 | End: 2022-11-15

## 2022-11-15 RX ORDER — KETOROLAC TROMETHAMINE 30 MG/ML
15 INJECTION, SOLUTION INTRAMUSCULAR; INTRAVENOUS ONCE
Status: COMPLETED | OUTPATIENT
Start: 2022-11-15 | End: 2022-11-15

## 2022-11-15 RX ADMIN — SODIUM CHLORIDE 1000 ML: 0.9 INJECTION, SOLUTION INTRAVENOUS at 17:58

## 2022-11-15 RX ADMIN — MAGNESIUM SULFATE HEPTAHYDRATE 2 G: 40 INJECTION, SOLUTION INTRAVENOUS at 18:01

## 2022-11-15 RX ADMIN — DEXAMETHASONE SODIUM PHOSPHATE 10 MG: 4 INJECTION, SOLUTION INTRAMUSCULAR; INTRAVENOUS at 18:37

## 2022-11-15 RX ADMIN — KETOROLAC TROMETHAMINE 15 MG: 30 INJECTION, SOLUTION INTRAMUSCULAR at 17:46

## 2022-11-15 RX ADMIN — DIPHENHYDRAMINE HYDROCHLORIDE 25 MG: 50 INJECTION, SOLUTION INTRAMUSCULAR; INTRAVENOUS at 17:42

## 2022-11-15 RX ADMIN — METOCLOPRAMIDE 10 MG: 5 INJECTION, SOLUTION INTRAMUSCULAR; INTRAVENOUS at 17:50

## 2022-11-15 NOTE — ED ATTENDING ATTESTATION
11/15/2022  I, General Bosworth, MD, saw and evaluated the patient  I have discussed the patient with the resident/non-physician practitioner and agree with the resident's/non-physician practitioner's findings, Plan of Care, and MDM as documented in the resident's/non-physician practitioner's note, except where noted  All available labs and Radiology studies were reviewed  I was present for key portions of any procedure(s) performed by the resident/non-physician practitioner and I was immediately available to provide assistance  At this point I agree with the current assessment done in the Emergency Department  I have conducted an independent evaluation of this patient a history and physical is as follows: This is a 43-year-old female without any significantly related past medical history presented to the ED today for complaint of a headache  Her headache was generalized, but particularly pronounced in the bilateral frontal regions  She had some photophobia associated  She did not have any neck stiffness/tightness  Her headache has developed over the past 24 hours  She otherwise denies any significantly related symptoms  Her physical exam for the most part is unremarkable  Her differential diagnosis includes:  Migraine versus tension versus cluster headache versus other  Patient received initial migraine cocktail with moderate improvement in her symptoms  She then received dexamethasone, after re-evaluation, and her symptoms completely resolved  Patient has had neuroimaging previously, for similar headaches, and it was negative  Patient was given follow-up with neurology headache clinic for continued management  Patient was given strict return precautions with which she agreed to comply  She was discharged home in stable condition    ED Course         Critical Care Time  Procedures

## 2022-11-15 NOTE — DISCHARGE INSTRUCTIONS
Please return to the ED if you develop another severe headache, especially one lasting multiple days  Return if you develop numbness or weakness on one side of your body or develop neck stiffness and a fever in association with headache

## 2022-11-15 NOTE — ED PROVIDER NOTES
History  Chief Complaint   Patient presents with   • Weakness - Generalized     Pt states that she has been having a headache since yesterday, but today she started to feel weak and her L arm feels weak  Sridevi Chopra is a 76-year-old female who presents emergency department for evaluation of headache which began yesterday afternoon  She states the headache came on gradually, has gotten worse overnight, is now an 8-9/10  She characterizes it as pulsating, located in the frontal area of her head and behind her eyes  She has associated left hand/wrist tingling, palpitations, and generalized fatigue  She denies photophobia or phonophobia, changes in headache with changes in head position, dizziness/lightheadedness, sinus congestion, changes in hearing, fever/chills, N/V/D/C, chest pain, LE edema, abdominal pain  She does endorse increased recent stressors in her life  History provided by:  Patient   used: No        Prior to Admission Medications   Prescriptions Last Dose Informant Patient Reported? Taking? Omega-3 Fatty Acids (fish oil) 1,000 mg  Self No No   Sig: Take 2 capsules (2,000 mg total) by mouth 2 (two) times a day   escitalopram (LEXAPRO) 10 mg tablet   No No   Sig: Take 1 tablet (10 mg total) by mouth daily   metoprolol succinate (TOPROL-XL) 100 mg 24 hr tablet  Self No No   Sig: Take 1 tablet (100 mg total) by mouth daily   norethindrone (Zelda) 0 35 MG tablet   No No   Sig: Take 1 tablet (0 35 mg total) by mouth daily Take at the same time every day; avoid missing pills     rosuvastatin (CRESTOR) 10 MG tablet  Self No No   Sig: Take 1 tablet (10 mg total) by mouth daily      Facility-Administered Medications: None       Past Medical History:   Diagnosis Date   • Female infertility    • Hyperlipemia    • Hypertension    • Mitral stenosis        Past Surgical History:   Procedure Laterality Date   • KNEE SURGERY     • WISDOM TOOTH EXTRACTION         Family History   Problem Relation Age of Onset   • Heart disease Father    • Diabetes Father    • Stomach cancer Brother    • Hypertension Mother      I have reviewed and agree with the history as documented  E-Cigarette/Vaping   • E-Cigarette Use Never User      E-Cigarette/Vaping Substances   • Nicotine No    • THC No    • CBD No    • Flavoring No    • Other No    • Unknown No      Social History     Tobacco Use   • Smoking status: Never Smoker   • Smokeless tobacco: Never Used   Vaping Use   • Vaping Use: Never used   Substance Use Topics   • Alcohol use: Yes     Comment: none   • Drug use: Never        Review of Systems   Constitutional: Positive for fatigue  Negative for appetite change, chills and fever  HENT: Negative for congestion, rhinorrhea and sore throat  Eyes: Negative for visual disturbance  Respiratory: Negative for cough and shortness of breath  Cardiovascular: Positive for palpitations  Negative for chest pain and leg swelling  Gastrointestinal: Negative for blood in stool, constipation, diarrhea, nausea and vomiting  Genitourinary: Negative for dysuria and hematuria  Musculoskeletal: Negative for back pain, gait problem, myalgias and neck pain  Skin: Negative for color change and rash  Allergic/Immunologic: Negative for environmental allergies and food allergies  Neurological: Positive for headaches  Negative for dizziness, syncope, weakness, light-headedness and numbness  Tingling of left wrist and hand that does not improve with movement or positional changes  Psychiatric/Behavioral: Negative for sleep disturbance  All other systems reviewed and are negative        Physical Exam  ED Triage Vitals   Temperature Pulse Respirations Blood Pressure SpO2   11/15/22 1537 11/15/22 1537 11/15/22 1537 11/15/22 1537 11/15/22 1537   98 1 °F (36 7 °C) 87 16 131/81 98 %      Temp Source Heart Rate Source Patient Position - Orthostatic VS BP Location FiO2 (%)   11/15/22 1537 11/15/22 1646 11/15/22 1646 11/15/22 1646 --   Oral Monitor Lying Right arm       Pain Score       11/15/22 1646       9             Orthostatic Vital Signs  Vitals:    11/15/22 1537 11/15/22 1646 11/15/22 1805   BP: 131/81 121/69 131/84   Pulse: 87 60 64   Patient Position - Orthostatic VS:  Lying Lying       Physical Exam  Vitals reviewed  Constitutional:       Appearance: She is not ill-appearing, toxic-appearing or diaphoretic  Comments: She appears uncomfortable   HENT:      Head: Normocephalic and atraumatic  Right Ear: External ear normal       Left Ear: External ear normal       Nose: Nose normal       Mouth/Throat:      Mouth: Mucous membranes are moist       Pharynx: Oropharynx is clear  No oropharyngeal exudate or posterior oropharyngeal erythema  Eyes:      General: No scleral icterus  Right eye: No discharge  Left eye: No discharge  Extraocular Movements: Extraocular movements intact  Conjunctiva/sclera: Conjunctivae normal       Pupils: Pupils are equal, round, and reactive to light  Cardiovascular:      Rate and Rhythm: Normal rate and regular rhythm  Pulses: Normal pulses  Heart sounds: Normal heart sounds  Pulmonary:      Effort: Pulmonary effort is normal  No respiratory distress  Breath sounds: Normal breath sounds  No stridor  No wheezing, rhonchi or rales  Abdominal:      General: Abdomen is flat  Bowel sounds are normal  There is no distension  Palpations: Abdomen is soft  There is no mass  Tenderness: There is no abdominal tenderness  There is no guarding or rebound  Musculoskeletal:         General: No swelling, tenderness or signs of injury  Normal range of motion  Cervical back: Normal range of motion and neck supple  No rigidity or tenderness  Right lower leg: No edema  Left lower leg: No edema  Lymphadenopathy:      Cervical: No cervical adenopathy  Skin:     General: Skin is warm and dry        Capillary Refill: Capillary refill takes less than 2 seconds  Coloration: Skin is not jaundiced  Findings: No rash  Neurological:      Mental Status: She is alert and oriented to person, place, and time  Mental status is at baseline  Cranial Nerves: No cranial nerve deficit  Sensory: No sensory deficit  Motor: No weakness        Gait: Gait normal    Psychiatric:         Mood and Affect: Mood normal          Behavior: Behavior normal          ED Medications  Medications   magnesium sulfate 2 g/50 mL IVPB (premix) 2 g (2 g Intravenous New Bag 11/15/22 1801)   sodium chloride 0 9 % bolus 1,000 mL (1,000 mL Intravenous New Bag 11/15/22 1758)   diphenhydrAMINE (BENADRYL) injection 25 mg (25 mg Intravenous Given 11/15/22 1742)   metoclopramide (REGLAN) injection 10 mg (10 mg Intravenous Given 11/15/22 1750)   ketorolac (TORADOL) injection 15 mg (15 mg Intravenous Given 11/15/22 1746)   dexamethasone (DECADRON) injection 10 mg (10 mg Intravenous Given 11/15/22 1837)       Diagnostic Studies  Results Reviewed     Procedure Component Value Units Date/Time    HS Troponin 0hr (reflex protocol) [[id number]]  (Normal) Collected: 11/15/22 1540    Lab Status: Final result Specimen: Blood from Arm, Right Updated: 11/15/22 1609     hs TnI 0hr <2 ng/L     Comprehensive metabolic panel [[id number]] Collected: 11/15/22 1540    Lab Status: Final result Specimen: Blood from Arm, Right Updated: 11/15/22 1602     Sodium 137 mmol/L      Potassium 4 1 mmol/L      Chloride 102 mmol/L      CO2 29 mmol/L      ANION GAP 6 mmol/L      BUN 14 mg/dL      Creatinine 1 26 mg/dL      Glucose 90 mg/dL      Calcium 9 0 mg/dL      AST 14 U/L      ALT 9 U/L      Alkaline Phosphatase 68 U/L      Total Protein 7 3 g/dL      Albumin 4 2 g/dL      Total Bilirubin 0 58 mg/dL      eGFR 53 ml/min/1 73sq m     Narrative:      [place] guidelines for Chronic Kidney Disease (CKD):   •  Stage 1 with normal or high GFR (GFR > 90 mL/min/1 73 square meters)  •  Stage 2 Mild CKD (GFR = 60-89 mL/min/1 73 square meters)  •  Stage 3A Moderate CKD (GFR = 45-59 mL/min/1 73 square meters)  •  Stage 3B Moderate CKD (GFR = 30-44 mL/min/1 73 square meters)  •  Stage 4 Severe CKD (GFR = 15-29 mL/min/1 73 square meters)  •  Stage 5 End Stage CKD (GFR <15 mL/min/1 73 square meters)  Note: GFR calculation is accurate only with a steady state creatinine    CBC and differential [[id number]]  (Abnormal) Collected: 11/15/22 1540    Lab Status: Final result Specimen: Blood from Arm, Right Updated: 11/15/22 1546     WBC 6 06 Thousand/uL      RBC 3 80 Million/uL      Hemoglobin 12 3 g/dL      Hematocrit 37 3 %      MCV 98 fL      MCH 32 4 pg      MCHC 33 0 g/dL      RDW 11 9 %      MPV 9 4 fL      Platelets 933 Thousands/uL      nRBC 0 /100 WBCs      Neutrophils Relative 69 %      Immat GRANS % 0 %      Lymphocytes Relative 23 %      Monocytes Relative 7 %      Eosinophils Relative 0 %      Basophils Relative 1 %      Neutrophils Absolute 4 10 Thousands/µL      Immature Grans Absolute 0 01 Thousand/uL      Lymphocytes Absolute 1 42 Thousands/µL      Monocytes Absolute 0 45 Thousand/µL      Eosinophils Absolute 0 02 Thousand/µL      Basophils Absolute 0 06 Thousands/µL                  No orders to display         Procedures  Procedures      ED Course  ED Course as of 11/15/22 1900   Tue Nov 15, 2022   1537 Blood Pressure: 131/81   1537 Pulse: 87   1620 Patient is a 36-year-old female who presents to the ED for evaluation of headache that began yesterday  She states she has associated left wrist/hand tingling and intermittent palpitations and fatigue with notable increased life stressors  She denies fever/chills, N/V/D/C, dysuria/hematuria, dizziness/lightheadedness, loss of sensation in extremities, CP, SOB, abdominal pain, LE edema, URI symptoms      She states this is not the worst headache of her life but that she feels it is currently at an 8-9/10 and has not been helped by tylenol  Headache did not come on suddenly, has been building since yesterday and she was able to go to bed last night without issue  Will order migraine cocktail and reassess  1651 hs TnI 0hr: <2   0205 CBC, CMP within normal limits   1652 Blood Pressure: 121/69   1652 Pulse: 60   1813 Patient's pain has decreased to a 5/10 overall and her left hand tingling has resolved at this time  Given persistent moderate headache, will order dexamethasone and valproate  1821 Notified via TT valproate is on nationwide shortage, d/c'd order  [phone] Patient states her headache symptoms have completely resolved, is stable at this time and amenable to discharge to home/self care with scheduled follow-up visit tomorrow with PCP  MDM  Number of Diagnoses or Management Options  Acute nonintractable headache, unspecified headache type  Diagnosis management comments: Patient is a 41 y/o F who presented to the ED for evaluation of headache which began yesterday  She states this headache has worsened gradually overnight and progressed to an 8-9/10, but that this is not the worst headache of her life  She has associated tingling in her left hand and palpitations, and states she has had increased recent life stressors she feels might be contributing to this  Initial /81, repeat 121/69  Troponin 0hr <2  Low suspicion for influenza at this time given patient denial of fever/chills, cough, congestion/runny nose, N/V/D/C, myalgias, fever  Low suspicion for [facility] given gradual onset and prior headaches of similar magnitude  She was given a migraine cocktail of diphenhydramine, metoclopramide, toradol, and magnesium sulfate, in addition to a NS bolus  This decreased her headache to 5/10, at which time she was given dexamethasone    At the time of final reassessment she states her headache symptoms have completely resolved; she is stable for discharge at this time to discharge home/self-care  Return precautions provided and patient instructed to follow up with PCP tomorrow as scheduled and call neurology for headache clinic as needed  Amount and/or Complexity of Data Reviewed  Clinical lab tests: ordered and reviewed  Tests in the medicine section of CPT®: reviewed  Decide to obtain previous medical records or to obtain history from someone other than the patient: yes  Discuss the patient with other providers: yes    Risk of Complications, Morbidity, and/or Mortality  Presenting problems: moderate  Diagnostic procedures: low  Management options: low    Patient Progress  Patient progress: resolved      Disposition  Final diagnoses:   Acute nonintractable headache, unspecified headache type     Time reflects when diagnosis was documented in both MDM as applicable and the Disposition within this note     Time User Action Codes Description Comment    11/15/2022  6:55 PM Sarah Bradley Add [R51 9] Acute nonintractable headache, unspecified headache type       ED Disposition     ED Disposition   Discharge    Condition   Stable    Date/Time   Tue Nov 15, 2022  6:56 PM    Comment   Rojean Rabon A [place] discharge to home/self care  Follow-up Information     Follow up With Specialties Details Why Contact Info Additional Information    [facility] [place] [facility] Call  Call to make an appointment with headache clinic if symptoms worsen  [phone] [facility] [address] [facility], [address] [place] [address], [address] [place] [address] [place]    Bandar Shanks MD Family Medicine  Keep your appointment tomorrow with your PCP and discuss your visit here today  [address]  [address]             Patient's Medications   Discharge Prescriptions    No medications on file     No discharge procedures on file      PDMP Review       Value Time User    PDMP Reviewed  Yes 5/16/2022 11:11 PM Nehemiah Rod MD           ED Provider  Attending physically available and evaluated Diane Lutz I managed the patient along with the ED Attending      Electronically Signed by         Brigida Dakins, DO  11/15/22 4605

## 2022-11-16 ENCOUNTER — OFFICE VISIT (OUTPATIENT)
Dept: FAMILY MEDICINE CLINIC | Facility: CLINIC | Age: 42
End: 2022-11-16

## 2022-11-16 VITALS
RESPIRATION RATE: 16 BRPM | OXYGEN SATURATION: 99 % | HEIGHT: 62 IN | HEART RATE: 95 BPM | DIASTOLIC BLOOD PRESSURE: 82 MMHG | BODY MASS INDEX: 24.66 KG/M2 | TEMPERATURE: 97 F | SYSTOLIC BLOOD PRESSURE: 126 MMHG | WEIGHT: 134 LBS

## 2022-11-16 DIAGNOSIS — F41.9 ANXIETY AND DEPRESSION: Primary | ICD-10-CM

## 2022-11-16 DIAGNOSIS — F32.A ANXIETY AND DEPRESSION: Primary | ICD-10-CM

## 2022-11-16 DIAGNOSIS — I10 ESSENTIAL HYPERTENSION: ICD-10-CM

## 2022-11-16 RX ORDER — ESCITALOPRAM OXALATE 20 MG/1
20 TABLET ORAL DAILY
Qty: 30 TABLET | Refills: 6 | Status: SHIPPED | OUTPATIENT
Start: 2022-11-16

## 2022-11-16 NOTE — ASSESSMENT & PLAN NOTE
Pt still with anxiety and depression  Pt not able to work yet will increase meds pt was supposed to go back 11/24 but unable  To work  Will extend to 11/23 follow up 3 weeks form filled out

## 2022-11-16 NOTE — PROGRESS NOTES
Name: Porfirio Ferguson      : 1980      MRN: [id number]  Encounter Provider: Tracie Shane MD  Encounter Date: 2022   Encounter department: [address] [place] [address] MEDICINE    Assessment & Plan     1  Anxiety and depression  Assessment & Plan:  Pt still with anxiety and depression  Pt not able to work yet will increase meds pt was supposed to go back  but unable  To work  Will extend to  follow up 3 weeks form filled out    Orders:  -     escitalopram (LEXAPRO) 20 mg tablet; Take 1 tablet (20 mg total) by mouth daily    2  Essential hypertension  Assessment & Plan:  Well controlled on current therapy continue with current medications and will reassess next visit               Subjective      HPI pt here for reevaluation depression and anxiety pt was in ER yesterday for headache generalized weakness and anxiety pt still very anxious  And depression not suicidal felt initially better but now medication is falling pt with insomnia  Review of Systems   Psychiatric/Behavioral: Positive for dysphoric mood and sleep disturbance (insomnia)  Negative for self-injury and suicidal ideas  The patient is nervous/anxious  Current Outpatient Medications on File Prior to Visit   Medication Sig   • metoprolol succinate (TOPROL-XL) 100 mg 24 hr tablet Take 1 tablet (100 mg total) by mouth daily   • norethindrone (Zelda) 0 35 MG tablet Take 1 tablet (0 35 mg total) by mouth daily Take at the same time every day; avoid missing pills     • Omega-3 Fatty Acids (fish oil) 1,000 mg Take 2 capsules (2,000 mg total) by mouth 2 (two) times a day   • rosuvastatin (CRESTOR) 10 MG tablet Take 1 tablet (10 mg total) by mouth daily   • [DISCONTINUED] escitalopram (LEXAPRO) 10 mg tablet Take 1 tablet (10 mg total) by mouth daily       Objective     /82 (BP Location: Left arm, Patient Position: Sitting, Cuff Size: Standard)   Pulse 95   Temp (!) 97 °F (36 1 °C) (Temporal)   Resp 16   Ht 5' 2" (1 575 m)   Wt 60 8 kg (134 lb)   SpO2 99%   BMI 24 51 kg/m²     Physical Exam  Constitutional:       General: She is not in acute distress  Appearance: Normal appearance  She is well-developed  She is not ill-appearing  Eyes:      Extraocular Movements: Extraocular movements intact  Neck:      Thyroid: No thyromegaly  Cardiovascular:      Rate and Rhythm: Normal rate  Pulmonary:      Effort: Pulmonary effort is normal  No respiratory distress  Breath sounds: Normal breath sounds  Musculoskeletal:      Cervical back: Normal range of motion  Neurological:      General: No focal deficit present  Mental Status: She is alert and oriented to person, place, and time  Mental status is at baseline     Psychiatric:         Mood and Affect: Mood normal          Behavior: Behavior normal       Comments: Anxious in appearance       Roxanne Reese MD

## 2022-11-17 LAB
ATRIAL RATE: 62 BPM
P AXIS: 59 DEGREES
PR INTERVAL: 140 MS
QRS AXIS: 78 DEGREES
QRSD INTERVAL: 96 MS
QT INTERVAL: 404 MS
QTC INTERVAL: 410 MS
T WAVE AXIS: 52 DEGREES
VENTRICULAR RATE: 62 BPM

## 2022-11-30 ENCOUNTER — RA CDI HCC (OUTPATIENT)
Dept: OTHER | Facility: HOSPITAL | Age: 42
End: 2022-11-30

## 2022-11-30 NOTE — PROGRESS NOTES
[facility]  coding opportunities       Chart reviewed, no opportunity found: CHART REVIEWED, NO OPPORTUNITY FOUND        Patients Insurance        Commercial Insurance: [employer]

## 2022-12-07 ENCOUNTER — OFFICE VISIT (OUTPATIENT)
Dept: FAMILY MEDICINE CLINIC | Facility: CLINIC | Age: 42
End: 2022-12-07

## 2022-12-07 VITALS
TEMPERATURE: 98 F | DIASTOLIC BLOOD PRESSURE: 62 MMHG | OXYGEN SATURATION: 100 % | WEIGHT: 136 LBS | RESPIRATION RATE: 16 BRPM | HEART RATE: 74 BPM | SYSTOLIC BLOOD PRESSURE: 106 MMHG | BODY MASS INDEX: 25.03 KG/M2 | HEIGHT: 62 IN

## 2022-12-07 DIAGNOSIS — F41.9 ANXIETY AND DEPRESSION: ICD-10-CM

## 2022-12-07 DIAGNOSIS — F32.A ANXIETY AND DEPRESSION: ICD-10-CM

## 2022-12-07 DIAGNOSIS — I10 ESSENTIAL HYPERTENSION: Primary | ICD-10-CM

## 2022-12-07 RX ORDER — ESCITALOPRAM OXALATE 10 MG/1
10 TABLET ORAL DAILY
Qty: 30 TABLET | Refills: 6 | Status: SHIPPED | OUTPATIENT
Start: 2022-12-07

## 2022-12-07 RX ORDER — BUPROPION HYDROCHLORIDE 150 MG/1
150 TABLET ORAL EVERY MORNING
Qty: 30 TABLET | Refills: 6 | Status: SHIPPED | OUTPATIENT
Start: 2022-12-07

## 2022-12-07 NOTE — PROGRESS NOTES
Name: Adelaide Bowen      : 1980      MRN: [id number]  Encounter Provider: Marquis Kenia MD  Encounter Date: 2022   Encounter department: [address] [place] [address] MEDICINE    Assessment & Plan     1  Essential hypertension  Assessment & Plan:  Well controlled on current therapy continue with current medications and will reassess next visit        2  Anxiety and depression  Assessment & Plan:  Pt not tolerating the 20mg  lexapro has nausea and headache feels better on 10mg po qd  And add wellbutrin 150xl 1 po qd  Follow up 1 mo Form filled out return     Orders:  -     escitalopram (LEXAPRO) 10 mg tablet; Take 1 tablet (10 mg total) by mouth daily  -     buPROPion (Wellbutrin XL) 150 mg 24 hr tablet; Take 1 tablet (150 mg total) by mouth every morning           Subjective      HPI pt here for 3 week evaluation of depression and anxiety unable to tolerate 20mg lexapro due to nausea and headache did tolerate the 10mg po qd still with depression not suicidal  Review of Systems   Psychiatric/Behavioral: Positive for dysphoric mood  Negative for self-injury, sleep disturbance and suicidal ideas  The patient is nervous/anxious  Current Outpatient Medications on File Prior to Visit   Medication Sig   • metoprolol succinate (TOPROL-XL) 100 mg 24 hr tablet Take 1 tablet (100 mg total) by mouth daily   • norethindrone (Zelda) 0 35 MG tablet Take 1 tablet (0 35 mg total) by mouth daily Take at the same time every day; avoid missing pills     • Omega-3 Fatty Acids (fish oil) 1,000 mg Take 2 capsules (2,000 mg total) by mouth 2 (two) times a day   • rosuvastatin (CRESTOR) 10 MG tablet Take 1 tablet (10 mg total) by mouth daily   • [DISCONTINUED] escitalopram (LEXAPRO) 20 mg tablet Take 1 tablet (20 mg total) by mouth daily       Objective     /62   Pulse 74   Temp 98 °F (36 7 °C)   Resp 16   Ht 5' 2" (1 575 m)   Wt 61 7 kg (136 lb)   SpO2 100%   BMI 24 87 kg/m²     Physical Exam  Constitutional:       General: She is not in acute distress  Appearance: Normal appearance  She is well-developed  She is not ill-appearing  Eyes:      Extraocular Movements: Extraocular movements intact  Neck:      Thyroid: No thyromegaly  Cardiovascular:      Rate and Rhythm: Normal rate  Pulmonary:      Effort: Pulmonary effort is normal  No respiratory distress  Breath sounds: Normal breath sounds  Musculoskeletal:      Cervical back: Normal range of motion  Neurological:      General: No focal deficit present  Mental Status: She is alert and oriented to person, place, and time  Mental status is at baseline     Psychiatric:         Mood and Affect: Mood normal          Behavior: Behavior normal        Faith Hummel MD

## 2022-12-07 NOTE — ASSESSMENT & PLAN NOTE
Pt not tolerating the 20mg  lexapro has nausea and headache feels better on 10mg po qd  And add wellbutrin 150xl 1 po qd  Follow up 1 mo Form filled out return 1/23

## 2022-12-19 ENCOUNTER — HOSPITAL ENCOUNTER (OUTPATIENT)
Dept: ULTRASOUND IMAGING | Facility: HOSPITAL | Age: 42
Discharge: HOME | End: 2022-12-19

## 2022-12-19 DIAGNOSIS — N80.03 ADENOMYOSIS OF UTERUS: ICD-10-CM

## 2022-12-19 DIAGNOSIS — N70.11 HYDROSALPINX: ICD-10-CM

## 2023-01-11 ENCOUNTER — RA CDI HCC (OUTPATIENT)
Dept: OTHER | Facility: HOSPITAL | Age: 43
End: 2023-01-11

## 2023-01-11 NOTE — PROGRESS NOTES
[facility]  coding opportunities       Chart reviewed, no opportunity found: CHART REVIEWED, NO OPPORTUNITY FOUND        Patients Insurance        Commercial Insurance: [employer]

## 2023-01-12 ENCOUNTER — RA CDI HCC (OUTPATIENT)
Dept: OTHER | Facility: HOSPITAL | Age: 43
End: 2023-01-12

## 2023-01-19 ENCOUNTER — OFFICE VISIT (OUTPATIENT)
Dept: FAMILY MEDICINE CLINIC | Facility: CLINIC | Age: 43
End: 2023-01-19

## 2023-01-19 VITALS
BODY MASS INDEX: 25.21 KG/M2 | SYSTOLIC BLOOD PRESSURE: 106 MMHG | TEMPERATURE: 97.3 F | HEIGHT: 62 IN | WEIGHT: 137 LBS | HEART RATE: 68 BPM | RESPIRATION RATE: 16 BRPM | OXYGEN SATURATION: 99 % | DIASTOLIC BLOOD PRESSURE: 66 MMHG

## 2023-01-19 DIAGNOSIS — I34.0 NONRHEUMATIC MITRAL VALVE REGURGITATION: ICD-10-CM

## 2023-01-19 DIAGNOSIS — F32.A ANXIETY AND DEPRESSION: Primary | ICD-10-CM

## 2023-01-19 DIAGNOSIS — I10 ESSENTIAL HYPERTENSION: ICD-10-CM

## 2023-01-19 DIAGNOSIS — R00.2 PALPITATIONS: ICD-10-CM

## 2023-01-19 DIAGNOSIS — F41.9 ANXIETY AND DEPRESSION: Primary | ICD-10-CM

## 2023-01-19 PROBLEM — I34.2 NONRHEUMATIC MITRAL VALVE STENOSIS: Status: RESOLVED | Noted: 2021-03-12 | Resolved: 2023-01-19

## 2023-01-19 RX ORDER — METOPROLOL SUCCINATE 25 MG/1
25 TABLET, EXTENDED RELEASE ORAL DAILY
Qty: 30 TABLET | Refills: 6 | Status: SHIPPED | OUTPATIENT
Start: 2023-01-19

## 2023-01-19 NOTE — PROGRESS NOTES
Name: Gordo Beard      : 1980      MRN: [id number]  Encounter Provider: Bird Otero MD  Encounter Date: 2023   Encounter department: [address] [place] [address] MEDICINE    Assessment & Plan     1  Anxiety and depression  Assessment & Plan:  Pt doing better on meds will return to work next week       2  Palpitations  Assessment & Plan:  Pt  On metoprolol 100mg bp low now without meds will decrease metoprolol to 25mg po qd follow up 1 mo    Orders:  -     metoprolol succinate (TOPROL-XL) 25 mg 24 hr tablet; Take 1 tablet (25 mg total) by mouth daily    3  Nonrheumatic mitral valve regurgitation  Assessment & Plan:  mild on echo      4  Essential hypertension  Assessment & Plan:  Pt  On metoprolol 100mg for palpitations  and pressure  Will decrease to 25mg po qd follow up 1 mo             Subjective      HPI pt here for reevaluation depression and anxiety feels the medicine is working has decreased sadness no anxiery pt will be going back to work   Review of Systems   Respiratory: Negative for cough, chest tightness and shortness of breath  Cardiovascular: Negative for chest pain, palpitations and leg swelling  Neurological: Negative for dizziness, weakness, light-headedness and headaches  Psychiatric/Behavioral: Negative for dysphoric mood  The patient is not nervous/anxious  Current Outpatient Medications on File Prior to Visit   Medication Sig   • buPROPion (Wellbutrin XL) 150 mg 24 hr tablet Take 1 tablet (150 mg total) by mouth every morning   • escitalopram (LEXAPRO) 10 mg tablet Take 1 tablet (10 mg total) by mouth daily   • norethindrone (Zelda) 0 35 MG tablet Take 1 tablet (0 35 mg total) by mouth daily Take at the same time every day; avoid missing pills     • Omega-3 Fatty Acids (fish oil) 1,000 mg Take 2 capsules (2,000 mg total) by mouth 2 (two) times a day   • rosuvastatin (CRESTOR) 10 MG tablet Take 1 tablet (10 mg total) by mouth daily   • [DISCONTINUED] metoprolol succinate (TOPROL-XL) 100 mg 24 hr tablet Take 1 tablet (100 mg total) by mouth daily       Objective     /66   Pulse 68   Temp (!) 97 3 °F (36 3 °C) (Tympanic)   Resp 16   Ht 5' 2" (1 575 m)   Wt 62 1 kg (137 lb)   SpO2 99%   BMI 25 06 kg/m²     Physical Exam  Constitutional:       General: She is not in acute distress  Appearance: Normal appearance  She is well-developed  She is obese  She is not ill-appearing  Eyes:      Extraocular Movements: Extraocular movements intact  Pupils: Pupils are equal, round, and reactive to light  Cardiovascular:      Rate and Rhythm: Normal rate and regular rhythm  Pulses: Normal pulses  Heart sounds: Normal heart sounds  No murmur heard  Pulmonary:      Effort: Pulmonary effort is normal       Breath sounds: Normal breath sounds  Musculoskeletal:      Right lower leg: No edema  Left lower leg: No edema  Neurological:      General: No focal deficit present  Mental Status: She is alert and oriented to person, place, and time  Mental status is at baseline  Psychiatric:         Mood and Affect: Mood normal          Behavior: Behavior normal          Thought Content:  Thought content normal        Concetta Thompson MD

## 2023-01-19 NOTE — ASSESSMENT & PLAN NOTE
Pt  On metoprolol 100mg bp low now without meds will decrease metoprolol to 25mg po qd follow up 1 mo

## 2023-08-02 ENCOUNTER — APPOINTMENT (OUTPATIENT)
Dept: URGENT CARE | Facility: CLINIC | Age: 43
End: 2023-08-02

## 2023-08-02 DIAGNOSIS — Z00.00 PHYSICAL EXAM: ICD-10-CM

## 2023-08-02 PROCEDURE — 86480 TB TEST CELL IMMUN MEASURE: CPT

## 2023-08-05 LAB
GAMMA INTERFERON BACKGROUND BLD IA-ACNC: 0.07 IU/ML
M TB IFN-G BLD-IMP: POSITIVE
M TB IFN-G CD4+ BCKGRND COR BLD-ACNC: 0.63 IU/ML
M TB IFN-G CD4+ BCKGRND COR BLD-ACNC: 0.73 IU/ML
MITOGEN IGNF BCKGRD COR BLD-ACNC: >10 IU/ML

## 2023-08-10 DIAGNOSIS — R76.12 POSITIVE QUANTIFERON-TB GOLD TEST: Primary | ICD-10-CM

## 2023-08-16 ENCOUNTER — HOSPITAL ENCOUNTER (OUTPATIENT)
Dept: RADIOLOGY | Facility: HOSPITAL | Age: 43
Discharge: HOME | End: 2023-08-16

## 2023-08-16 DIAGNOSIS — R76.12 POSITIVE QUANTIFERON-TB GOLD TEST: ICD-10-CM

## 2023-08-16 PROCEDURE — 71046 X-RAY EXAM CHEST 2 VIEWS: CPT

## 2023-08-24 DIAGNOSIS — Z30.011 ORAL CONTRACEPTION INITIAL PRESCRIPTION: ICD-10-CM

## 2023-08-24 RX ORDER — ACETAMINOPHEN AND CODEINE PHOSPHATE 120; 12 MG/5ML; MG/5ML
SOLUTION ORAL
Qty: 84 TABLET | Refills: 0 | Status: SHIPPED | OUTPATIENT
Start: 2023-08-24

## 2023-08-24 NOTE — TELEPHONE ENCOUNTER
Please call pt - will refill OCP for 3 month supply, please reschedule cancelled appt in feb for OCP f/u and annual w/ PAP - never had OCP f/u since prescribed last year and overdue for annual. Thanks.

## 2025-01-09 NOTE — PROGRESS NOTES
Progress Note - Cardiology Office  [facility]    Monica A Buileschica 44 y.o. female MRN: [id number]  : 1980  Encounter: [id number]        Assessment & Plan  Nonrheumatic mitral valve regurgitation  Mild MR  Asymptomatic  Essential hypertension    Mixed hyperlipidemia    Hypertension   BP today is 118/78 mmHg  Stoped taking metoprolol since patient did not have insurance for a short.  Blood pressure is well-controlled, therefore will monitor and not resume metoprolol at the moment     Mixed hyperlipidemia  2022:  , , HDL 49, normal AST and ALT  2022: LDL 88, , HDL 50, normal AST and ALT  On fish oil 2 g, twice daily and Crestor 10 mg daily  Stopped taking Crestor and fish oil    > Will recheck labs and determine need for lipid-lowering therapy     Mitral regurgitation, mild, asymptomatic     TTE, 2022:  EF 55%, mild MR     TTE, 2018:  EF 60-65%  Mild to moderate mitral regurgitation  Trace tricuspid and pulmonic regurgitation     Stress and anxiety        RECOMMENDATIONS:  Lipid profile and LFTs.  Will call with test results and recommendations once completed  Low-salt and low-cholesterol diet  Regular cardiovascular exercise for 20-30 minutes daily          Please call [phone] if any questions.    HPI :     Monica A Buileschica is a 44 y.o. year old female who came for follow up.  She generally feels well and denies any cardiac symptoms.  According to her she has a family history of heart disease including pacemaker in her mother.  She was previously being treated with metoprolol for hypertension and with Crestor and fish oil for hyperlipidemia.  Patient had lost her insurance/job and therefore had discontinued the medications.  Her blood pressure is well-controlled therefore we will not resume metoprolol but rather monitor.  Will also recheck her lipid panel before determining need for antihypertensive medications    REVIEW OF  "SYSTEMS:  Denies any new or acute cardiac symptoms.  Denies chest pain, unusual dyspnea, palpitations or syncope      Historical Information   Past Medical History:   Diagnosis Date    Depression     Female infertility     Hyperlipemia     Hypertension     Mitral stenosis      Past Surgical History:   Procedure Laterality Date    KNEE SURGERY      WISDOM TOOTH EXTRACTION       Social History     Substance and Sexual Activity   Alcohol Use Yes    Comment: none     Social History     Substance and Sexual Activity   Drug Use Never     Social History     Tobacco Use   Smoking Status Never   Smokeless Tobacco Never     Family History:   Family History   Problem Relation Age of Onset    Heart disease Father     Diabetes Father     Stomach cancer Brother     Hypertension Mother        Meds/Allergies     No Known Allergies    Current Outpatient Medications:     buPROPion (Wellbutrin XL) 150 mg 24 hr tablet, Take 1 tablet (150 mg total) by mouth every morning, Disp: 30 tablet, Rfl: 6    escitalopram (LEXAPRO) 10 mg tablet, Take 1 tablet (10 mg total) by mouth daily, Disp: 30 tablet, Rfl: 6    norethindrone (MICRONOR) 0.35 MG tablet, TAKE 1 TABLET BY MOUTH ONCE DAILY AT  THE  SAME  TIME  EVERY  DAY  AVOID  MISSING  PILLS, Disp: 84 tablet, Rfl: 0    Omega-3 Fatty Acids (fish oil) 1,000 mg, Take 2 capsules (2,000 mg total) by mouth 2 (two) times a day, Disp: 240 capsule, Rfl: 3    metoprolol succinate (TOPROL-XL) 25 mg 24 hr tablet, Take 1 tablet (25 mg total) by mouth daily (Patient not taking: Reported on 1/13/2025), Disp: 30 tablet, Rfl: 6    rosuvastatin (CRESTOR) 10 MG tablet, Take 1 tablet (10 mg total) by mouth daily (Patient not taking: Reported on 1/13/2025), Disp: 30 tablet, Rfl: 5    Vitals: Blood pressure 118/78, pulse 80, height 5' 2\" (1.575 m), weight 59 kg (130 lb), SpO2 98%.    Body mass index is 23.78 kg/m².  Vitals:    01/13/25 0824   Weight: 59 kg (130 lb)     BP Readings from Last 3 Encounters:   01/13/25 " 118/78   01/19/23 106/66   12/07/22 106/62       Physical Exam:  Physical Exam    Neurologic:  Alert & oriented x 3, no new focal deficits, Not in any acute distress,  Constitutional:  Well developed, well nourished, non-toxic appearance   Eyes:  Pupil equal and reacting to light, conjunctiva normal,   HENT:  Atraumatic, oropharynx moist, Neck- normal range of motion, no tenderness,  Neck supple, No JVP, No LNP   Respiratory:  Bilateral air entry, mostly clear to auscultation  Cardiovascular: S1-S2 regular with a I/VI systolic murmur   GI:  Soft, nondistended, normal bowel sounds, nontender, no hepatosplenomegaly appreciated.  Musculoskeletal:  No tenderness, no deformities.   Skin:  Well hydrated, no rash   Lymphatic:  No lymphadenopathy noted   Extremities:  No edema and distal pulses are present        Diagnostic Studies Review Cardio:      EKG: Normal sinus rhythm, heart rate 80/min, nonspecific T wave abnormality    Cardiac testing:     Results for orders placed during the hospital encounter of 09/01/22    Echo complete w/ contrast if indicated    Interpretation Summary    Left Ventricle: Left ventricular cavity size is normal. Wall thickness is normal. The left ventricular ejection fraction is 55%. Systolic function is normal. Wall motion is normal. Diastolic function is normal.    Right Ventricle: Right ventricular cavity size is normal. Systolic function is normal.    Mitral Valve: There is mild regurgitation.        Imaging:  Chest X-Ray:   No Chest XR results available for this patient.    CT-scan of the chest:     No CTA results available for this patient.  Lab Review   Lab Results   Component Value Date    WBC 6.06 11/15/2022    HGB 12.3 11/15/2022    HCT 37.3 11/15/2022    MCV 98 11/15/2022    RDW 11.9 11/15/2022     11/15/2022     BMP:  Lab Results   Component Value Date    SODIUM 137 11/15/2022    K 4.1 11/15/2022     11/15/2022    CO2 29 11/15/2022    BUN 14 11/15/2022    CREATININE 1.26  "11/15/2022    GLUC 90 11/15/2022    GLUF 85 11/09/2022    CALCIUM 9.0 11/15/2022    EGFR 53 11/15/2022    MG 2.1 01/15/2021     LFT:  Lab Results   Component Value Date    AST 14 11/15/2022    ALT 9 11/15/2022    ALKPHOS 68 11/15/2022    TP 7.3 11/15/2022    ALB 4.2 11/15/2022      No components found for: \"TSH3\"  No results found for: \"[id number]\"  Lab Results   Component Value Date    HGBA1C 5.1 03/11/2021     Lipid Profile:   Lab Results   Component Value Date    CHOLESTEROL 180 11/09/2022    HDL 50 11/09/2022    LDLCALC 88 11/09/2022    TRIG 211 (H) 11/09/2022     Lab Results   Component Value Date    CHOLESTEROL 180 11/09/2022    CHOLESTEROL 244 (H) 08/04/2022     No results found for: \"CKTOTAL\", \"CKMB\", \"CKMBINDEX\", \"TROPONINI\"  No results found for: \"NTBNP\"   No results found for this or any previous visit (from the past 4 weeks).          Dr. Silvino Jerry MD, FACC      \"This note has been constructed using a voice recognition system.Therefore there may be syntax, spelling, and/or grammatical errors. Please call if you have any questions. \"  "

## 2025-01-13 ENCOUNTER — OFFICE VISIT (OUTPATIENT)
Dept: CARDIOLOGY CLINIC | Facility: CLINIC | Age: 45
End: 2025-01-13
Payer: COMMERCIAL

## 2025-01-13 VITALS
WEIGHT: 130 LBS | OXYGEN SATURATION: 98 % | DIASTOLIC BLOOD PRESSURE: 78 MMHG | HEIGHT: 62 IN | BODY MASS INDEX: 23.92 KG/M2 | SYSTOLIC BLOOD PRESSURE: 118 MMHG | HEART RATE: 80 BPM

## 2025-01-13 DIAGNOSIS — E78.2 MIXED HYPERLIPIDEMIA: Primary | ICD-10-CM

## 2025-01-13 DIAGNOSIS — I34.0 NONRHEUMATIC MITRAL VALVE REGURGITATION: Chronic | ICD-10-CM

## 2025-01-13 DIAGNOSIS — I10 ESSENTIAL HYPERTENSION: ICD-10-CM

## 2025-01-13 PROCEDURE — 99214 OFFICE O/P EST MOD 30 MIN: CPT | Performed by: INTERNAL MEDICINE

## 2025-01-13 PROCEDURE — 93000 ELECTROCARDIOGRAM COMPLETE: CPT | Performed by: INTERNAL MEDICINE

## 2025-01-15 ENCOUNTER — RESULTS FOLLOW-UP (OUTPATIENT)
Dept: CARDIOLOGY CLINIC | Facility: CLINIC | Age: 45
End: 2025-01-15

## 2025-01-15 DIAGNOSIS — E78.5 DYSLIPIDEMIA: ICD-10-CM

## 2025-01-15 DIAGNOSIS — I34.2 NONRHEUMATIC MITRAL VALVE STENOSIS: ICD-10-CM

## 2025-01-15 DIAGNOSIS — E78.2 MIXED HYPERLIPIDEMIA: ICD-10-CM

## 2025-01-15 LAB
ALBUMIN SERPL-MCNC: 4.4 G/DL (ref 3.9–4.9)
ALP SERPL-CCNC: 77 IU/L (ref 44–121)
ALT SERPL-CCNC: 10 IU/L (ref 0–32)
AST SERPL-CCNC: 17 IU/L (ref 0–40)
BILIRUB DIRECT SERPL-MCNC: 0.25 MG/DL (ref 0–0.4)
BILIRUB SERPL-MCNC: 1.1 MG/DL (ref 0–1.2)
CHOLEST SERPL-MCNC: 267 MG/DL (ref 100–199)
HDLC SERPL-MCNC: 54 MG/DL
LDLC SERPL CALC-MCNC: 181 MG/DL (ref 0–99)
LDLC/HDLC SERPL: 3.4 RATIO (ref 0–3.2)
PROT SERPL-MCNC: 7.2 G/DL (ref 6–8.5)
SL AMB VLDL CHOLESTEROL CALC: 32 MG/DL (ref 5–40)
TRIGL SERPL-MCNC: 171 MG/DL (ref 0–149)

## 2025-01-16 ENCOUNTER — TELEPHONE (OUTPATIENT)
Age: 45
End: 2025-01-16

## 2025-01-16 DIAGNOSIS — E78.2 MIXED HYPERLIPIDEMIA: Primary | ICD-10-CM

## 2025-01-16 RX ORDER — ROSUVASTATIN CALCIUM 40 MG/1
40 TABLET, COATED ORAL DAILY
Qty: 90 TABLET | Refills: 2 | Status: SHIPPED | OUTPATIENT
Start: 2025-01-16 | End: 2025-01-17 | Stop reason: CLARIF

## 2025-01-16 RX ORDER — CHLORAL HYDRATE 500 MG
2000 CAPSULE ORAL 2 TIMES DAILY
Qty: 180 CAPSULE | Refills: 4 | Status: SHIPPED | OUTPATIENT
Start: 2025-01-16 | End: 2025-01-20 | Stop reason: SDUPTHER

## 2025-01-16 NOTE — TELEPHONE ENCOUNTER
I spoke with patient, she verbalized understanding of results and is willing to initiate both crestor and fish oil therapy.   Asking for it to be sent to [facility].

## 2025-01-16 NOTE — TELEPHONE ENCOUNTER
PA for rosuvastatin 40 mg SUBMITTED to [employer]    via    []CMM-KEY:   [x]Surescripts-Case ID # [id number]   []Availity-Auth ID # NDC #   []Faxed to plan   []Other website   []Phone call Case ID #     [x]PA sent as URGENT    All office notes, labs and other pertaining documents and studies sent. Clinical questions answered. Awaiting determination from insurance company.     Turnaround time for your insurance to make a decision on your Prior Authorization can take 7-21 business days.

## 2025-01-16 NOTE — TELEPHONE ENCOUNTER
----- Message from Silvino Jerry MD sent at 1/15/2025  7:05 PM EST -----  Please call and inform patient that the blood test showed that both her LDL and triglycerides are quite high.  LDL is increased from previous 88, to 181.  If patient is agreeable she should be restarted on Crestor and also on fish oil

## 2025-01-17 ENCOUNTER — TELEPHONE (OUTPATIENT)
Dept: CARDIOLOGY CLINIC | Facility: CLINIC | Age: 45
End: 2025-01-17

## 2025-01-17 DIAGNOSIS — E78.5 DYSLIPIDEMIA: Primary | ICD-10-CM

## 2025-01-17 RX ORDER — ATORVASTATIN CALCIUM 80 MG/1
80 TABLET, FILM COATED ORAL DAILY
Qty: 90 TABLET | Refills: 2 | Status: SHIPPED | OUTPATIENT
Start: 2025-01-17 | End: 2025-01-20 | Stop reason: SDUPTHER

## 2025-01-17 NOTE — TELEPHONE ENCOUNTER
Access center called with patient on the other line.  Patient is currently at her pharmacy.  Pharmacy stated that Rosuvastatin is not covered under patient's insurance, but Atorvastatin is.   Secure Chat send to Dr. Jerry to cancel Rosuvastatin and order Atorvastatin.  Advised that patient call back in about 30 minutes if not received.

## 2025-01-20 DIAGNOSIS — E78.5 DYSLIPIDEMIA: ICD-10-CM

## 2025-01-20 DIAGNOSIS — E78.2 MIXED HYPERLIPIDEMIA: ICD-10-CM

## 2025-01-20 RX ORDER — ROSUVASTATIN CALCIUM 10 MG/1
10 TABLET, COATED ORAL DAILY
Qty: 30 TABLET | Refills: 5 | Status: SHIPPED | OUTPATIENT
Start: 2025-01-20 | End: 2025-01-21

## 2025-01-20 RX ORDER — CHLORAL HYDRATE 500 MG
2000 CAPSULE ORAL 2 TIMES DAILY
Qty: 180 CAPSULE | Refills: 4 | Status: SHIPPED | OUTPATIENT
Start: 2025-01-20 | End: 2025-01-21

## 2025-01-20 RX ORDER — CHLORAL HYDRATE 500 MG
2000 CAPSULE ORAL 2 TIMES DAILY
Qty: 240 CAPSULE | Refills: 3 | Status: SHIPPED | OUTPATIENT
Start: 2025-01-20

## 2025-01-20 RX ORDER — ATORVASTATIN CALCIUM 80 MG/1
80 TABLET, FILM COATED ORAL DAILY
Qty: 90 TABLET | Refills: 2 | Status: SHIPPED | OUTPATIENT
Start: 2025-01-20

## 2025-01-21 ENCOUNTER — TELEPHONE (OUTPATIENT)
Dept: CARDIOLOGY CLINIC | Facility: CLINIC | Age: 45
End: 2025-01-21

## 2025-01-21 ENCOUNTER — OFFICE VISIT (OUTPATIENT)
Dept: FAMILY MEDICINE CLINIC | Facility: CLINIC | Age: 45
End: 2025-01-21
Payer: COMMERCIAL

## 2025-01-21 VITALS
HEIGHT: 62 IN | WEIGHT: 127.2 LBS | TEMPERATURE: 96.8 F | HEART RATE: 82 BPM | DIASTOLIC BLOOD PRESSURE: 78 MMHG | RESPIRATION RATE: 18 BRPM | BODY MASS INDEX: 23.41 KG/M2 | SYSTOLIC BLOOD PRESSURE: 122 MMHG

## 2025-01-21 DIAGNOSIS — R19.7 DIARRHEA, UNSPECIFIED TYPE: ICD-10-CM

## 2025-01-21 DIAGNOSIS — Z13.0 SCREENING FOR DEFICIENCY ANEMIA: ICD-10-CM

## 2025-01-21 DIAGNOSIS — Z12.31 ENCOUNTER FOR SCREENING MAMMOGRAM FOR BREAST CANCER: ICD-10-CM

## 2025-01-21 DIAGNOSIS — Z11.59 NEED FOR HEPATITIS C SCREENING TEST: ICD-10-CM

## 2025-01-21 DIAGNOSIS — Z23 ENCOUNTER FOR IMMUNIZATION: ICD-10-CM

## 2025-01-21 DIAGNOSIS — Z01.419 ENCOUNTER FOR GYNECOLOGICAL EXAMINATION: ICD-10-CM

## 2025-01-21 DIAGNOSIS — Z13.1 SCREENING FOR DIABETES MELLITUS: ICD-10-CM

## 2025-01-21 DIAGNOSIS — Z13.29 SCREENING FOR THYROID DISORDER: ICD-10-CM

## 2025-01-21 DIAGNOSIS — Z00.00 ROUTINE ADULT HEALTH MAINTENANCE: Primary | ICD-10-CM

## 2025-01-21 PROCEDURE — 90471 IMMUNIZATION ADMIN: CPT

## 2025-01-21 PROCEDURE — 99386 PREV VISIT NEW AGE 40-64: CPT | Performed by: NURSE PRACTITIONER

## 2025-01-21 PROCEDURE — 90656 IIV3 VACC NO PRSV 0.5 ML IM: CPT

## 2025-01-21 NOTE — TELEPHONE ENCOUNTER
Received fax from [employer] for Rosuvastatin Calcium 10 mg tablet.    Key: [id number]    Will send to central scanning to be placed into patient's chart.

## 2025-01-21 NOTE — PATIENT INSTRUCTIONS
"Eat low residue diet with food low in fiber and follow bland diet.  Avoid Caffeine for 2 weeks.  Avoid dairy products.  Increase your fluid intake.   Keep a record of the number of times you urinate during the day.  You may slowly resume your normal level of activity once you feel better.  Pepto Bismol and kaopectate as needed.  Do not take any imodium for diarrhea.   Follow up for no improvement and worsening of symptoms and for fever, localized and severe abdominal pain, recurrent nausea, vomiting, and diarrhea.  Patient Education     Routine physical for adults   The Basics   Written by the doctors and editors at [facility]   What is a physical? -- A physical is a routine visit, or \"check-up,\" with your doctor. You might also hear it called a \"wellness visit\" or \"preventive visit.\"  During each visit, the doctor will:   Ask about your physical and mental health   Ask about your habits, behaviors, and lifestyle   Do an exam   Give you vaccines if needed   Talk to you about any medicines you take   Give advice about your health   Answer your questions  Getting regular check-ups is an important part of taking care of your health. It can help your doctor find and treat any problems you have. But it's also important for preventing health problems.  A routine physical is different from a \"sick visit.\" A sick visit is when you see a doctor because of a health concern or problem. Since physicals are scheduled ahead of time, you can think about what you want to ask the doctor.  How often should I get a physical? -- It depends on your age and health. In general, for people age 21 years and older:   If you are younger than 50 years, you might be able to get a physical every 3 years.   If you are 50 years or older, your doctor might recommend a physical every year.  If you have an ongoing health condition, like diabetes or high blood pressure, your doctor will probably want to see you more often.  What happens during a " "physical? -- In general, each visit will include:   Physical exam - The doctor or nurse will check your height, weight, heart rate, and blood pressure. They will also look at your eyes and ears. They will ask about how you are feeling and whether you have any symptoms that bother you.   Medicines - It's a good idea to bring a list of all the medicines you take to each doctor visit. Your doctor will talk to you about your medicines and answer any questions. Tell them if you are having any side effects that bother you. You should also tell them if you are having trouble paying for any of your medicines.   Habits and behaviors - This includes:   Your diet   Your exercise habits   Whether you smoke, drink alcohol, or use drugs   Whether you are sexually active   Whether you feel safe at home  Your doctor will talk to you about things you can do to improve your health and lower your risk of health problems. They will also offer help and support. For example, if you want to quit smoking, they can give you advice and might prescribe medicines. If you want to improve your diet or get more physical activity, they can help you with this, too.   Lab tests, if needed - The tests you get will depend on your age and situation. For example, your doctor might want to check your:   Cholesterol   Blood sugar   Iron level   Vaccines - The recommended vaccines will depend on your age, health, and what vaccines you already had. Vaccines are very important because they can prevent certain serious or deadly infections.   Discussion of screening - \"Screening\" means checking for diseases or other health problems before they cause symptoms. Your doctor can recommend screening based on your age, risk, and preferences. This might include tests to check for:   Cancer, such as breast, prostate, cervical, ovarian, colorectal, prostate, lung, or skin cancer   Sexually transmitted infections, such as chlamydia and gonorrhea   Mental health " conditions like depression and anxiety  Your doctor will talk to you about the different types of screening tests. They can help you decide which screenings to have. They can also explain what the results might mean.   Answering questions - The physical is a good time to ask the doctor or nurse questions about your health. If needed, they can refer you to other doctors or specialists, too.  Adults older than 65 years often need other care, too. As you get older, your doctor will talk to you about:   How to prevent falling at home   Hearing or vision tests   Memory testing   How to take your medicines safely   Making sure that you have the help and support you need at home  All topics are updated as new evidence becomes available and our peer review process is complete.  This topic retrieved from [employer] on: May 02, 2024.  Topic 760889 Version 1.0  Release: 32.4.3 - C32.122  © 2024 UpToDate, Inc. and/or its affiliates. All rights reserved.  Consumer Information Use and Disclaimer   Disclaimer: This generalized information is a limited summary of diagnosis, treatment, and/or medication information. It is not meant to be comprehensive and should be used as a tool to help the user understand and/or assess potential diagnostic and treatment options. It does NOT include all information about conditions, treatments, medications, side effects, or risks that may apply to a specific patient. It is not intended to be medical advice or a substitute for the medical advice, diagnosis, or treatment of a health care provider based on the health care provider's examination and assessment of a patient's specific and unique circumstances. Patients must speak with a health care provider for complete information about their health, medical questions, and treatment options, including any risks or benefits regarding use of medications. This information does not endorse any treatments or medications as safe, effective, or approved for  treating a specific patient. UpToDate, Inc. and its affiliates disclaim any warranty or liability relating to this information or the use thereof.The use of this information is governed by the Terms of Use, available at https://www.[employer]wer.com/en/know/clinical-effectiveness-terms. 2024© UpToDate, Inc. and its affiliates and/or licensors. All rights reserved.  Copyright   © 2024 UpToDate, Inc. and/or its affiliates. All rights reserved.  Patient Education     Diarrhea, Adult ED   General Information   You came to the Emergency Department (ED) for diarrhea. Most doctors say you have diarrhea if you have 3 or more runny or watery stools or bowel movements in a day. The doctors feel that the risk of a serious cause for your diarrhea is low.  Diarrhea that starts all of a sudden is most often caused by a virus or bacteria. This will likely get better on its own after a few days. Other things can cause you to have loose stools like:  Side effects from the medicines you take.  Problems digesting your food.  Problems with your digestive system.  You may be waiting on some test results. The staff will contact you if there are concerning results.  What care is needed at home?   Call your regular doctor to let them know you were in the ED. Make a follow-up appointment if you were told to.  Drink small amounts of fluid every 15 to 30 minutes. Good fluids to drink are water, broth, and oral electrolyte solutions. Sugar-free or very low sugar sports drinks are also OK.  Try to eat a small amount of food. Good foods to eat are potatoes, noodles, rice, oatmeal, crackers, soup, soft vegetables, and bananas. Avoid fatty foods and dairy products.  Wash your hands often. This will help keep others healthy. It is easy to spread diarrhea from one person to the next.  Stay home from school or work until you have stopped having diarrhea. Do not cook food for others while you have diarrhea.  If you were given any medicines, make sure  to take them as you were told.  When do I need to get emergency help?   Return to the ED if:   You have signs of severe fluid loss, such as:  No urine for more than 8 hours.  Feel very light-headed or like you are going to pass out.  Feel weak like you are going to fall.  Your stools have a large amount (more than 1 teaspoon or 5 mL) of blood in them.  You have very bad belly pain.  When do I need to call the doctor?   You have more than 6 runny, watery stools in 24 hours.  You have a fever of 101.3°F (38.5°C) or higher or chills that do not go away after a day.  You have very bad belly pain.  Your stools have a small amount (less than 1 teaspoon or 5 mL) of blood in them.  You develop early signs of fluid loss, such as:  Your urine is very dark colored.  Your mouth is dry.  You have muscle cramps.  You have a lack of energy.  You feel light-headed when you get up.  You have new or worsening symptoms.  Last Reviewed Date   2021-05-21  Consumer Information Use and Disclaimer   This generalized information is a limited summary of diagnosis, treatment, and/or medication information. It is not meant to be comprehensive and should be used as a tool to help the user understand and/or assess potential diagnostic and treatment options. It does NOT include all information about conditions, treatments, medications, side effects, or risks that may apply to a specific patient. It is not intended to be medical advice or a substitute for the medical advice, diagnosis, or treatment of a health care provider based on the health care provider's examination and assessment of a patient’s specific and unique circumstances. Patients must speak with a health care provider for complete information about their health, medical questions, and treatment options, including any risks or benefits regarding use of medications. This information does not endorse any treatments or medications as safe, effective, or approved for treating a specific  patient. UpToDate, Inc. and its affiliates disclaim any warranty or liability relating to this information or the use thereof. The use of this information is governed by the Terms of Use, available at https://www.[employer]Rep[employer].comuwer.com/en/know/clinical-effectiveness-terms   Copyright   Copyright © 2024 UpToDate, Inc. and its affiliates and/or licensors. All rights reserved.

## 2025-01-21 NOTE — TELEPHONE ENCOUNTER
Duplicate encounter created, please see telephone encounter from 1/16/25 regarding rosuvastatin  PA status. Please review patient's chart to see if there is already an encounter regarding the medication in question and to document anything regarding this medication in regards to anything regarding the authorization process etc before creating another encounter Thank You.

## 2025-01-21 NOTE — PROGRESS NOTES
Adult Annual Physical  Name: Monica A BuilesChica      : 1980      MRN: [id number]  Encounter Provider: FABIAN Licona  Encounter Date: 2025   Encounter department: [facility]  Chief Complaint   Patient presents with   • Establish Care     Amanda Rivera CMA    • Diarrhea     X 2 days       Assessment & Plan  Routine adult health maintenance         Screening for diabetes mellitus    Orders:  •  Basic metabolic panel; Future    Screening for deficiency anemia    Orders:  •  CBC and Platelet; Future    Screening for thyroid disorder    Orders:  •  TSH, 3rd generation with Free T4 reflex; Future    Need for hepatitis C screening test    Orders:  •  Hepatitis C antibody; Future    Encounter for screening mammogram for breast cancer    Orders:  •  Mammo screening bilateral w 3d and cad; Future    Encounter for gynecological examination    Orders:  •  Ambulatory Referral to Obstetrics / Gynecology; Future    Encounter for immunization    Orders:  •  influenza vaccine preservative-free 0.5 mL IM (Fluzone, Afluria, Fluarix, Flulaval)    Diarrhea, unspecified type  Eat low residue diet with food low in fiber and follow bland diet.  Avoid Caffeine for 2 weeks.  Avoid dairy products.  Increase your fluid intake.   Keep a record of the number of times you urinate during the day.  You may slowly resume your normal level of activity once you feel better.  Pepto Bismol and kaopectate as needed.  Do not take any imodium for diarrhea.   Follow up for no improvement and worsening of symptoms and for fever, localized and severe abdominal pain, recurrent nausea, vomiting, and diarrhea.  Will follow back if diarrhea persists more than a week then will consider stool testing       Immunizations and preventive care screenings were discussed with patient today. Appropriate education was printed on patient's after visit summary.    Counseling:  Alcohol/drug use: discussed moderation in alcohol intake, the  recommendations for healthy alcohol use, and avoidance of illicit drug use.  Dental Health: discussed importance of regular tooth brushing, flossing, and dental visits.  Injury prevention: discussed safety/seat belts, safety helmets, smoke detectors, carbon monoxide detectors, and smoking near bedding or upholstery.  Sexual health: discussed sexually transmitted diseases, partner selection, use of condoms, avoidance of unintended pregnancy, and contraceptive alternatives.  Exercise: the importance of regular exercise/physical activity was discussed. Recommend exercise 3-5 times per week for at least 30 minutes.          History of Present Illness     Adult Annual Physical:  Patient presents for annual physical. New to practice.  Personal and family medical history reviewed  Started with diarrhea  yesterday but denies fever, chills and abdominal pain  Under care of cardiologist.     Diet and Physical Activity:  - Diet/Nutrition: well balanced diet.  - Exercise: 5-7 times a week on average.    Depression Screening:    - PHQ-9 Score: 0    General Health:    - Vision: vision problems. recommended to follow with opthalmologist based on vision screening results  - Dental: regular dental visits.    /GYN Health:  - Follows with GYN: yes.     Review of Systems   Constitutional: Negative.    HENT: Negative.     Eyes: Negative.    Respiratory: Negative.     Cardiovascular: Negative.    Gastrointestinal: Negative.    Endocrine: Negative.    Genitourinary: Negative.    Musculoskeletal: Negative.    Skin: Negative.    Allergic/Immunologic: Negative.    Neurological: Negative.    Hematological: Negative.    Psychiatric/Behavioral: Negative.       Current Outpatient Medications on File Prior to Visit   Medication Sig Dispense Refill   • atorvastatin (LIPITOR) 80 mg tablet Take 1 tablet (80 mg total) by mouth daily 90 tablet 2   • Omega-3 Fatty Acids (fish oil) 1,000 mg Take 2 capsules (2,000 mg total) by mouth 2 (two) times a day  "240 capsule 3   • [DISCONTINUED] buPROPion (Wellbutrin XL) 150 mg 24 hr tablet Take 1 tablet (150 mg total) by mouth every morning (Patient not taking: Reported on 1/21/2025) 30 tablet 6   • [DISCONTINUED] escitalopram (LEXAPRO) 10 mg tablet Take 1 tablet (10 mg total) by mouth daily (Patient not taking: Reported on 1/21/2025) 30 tablet 6   • [DISCONTINUED] metoprolol succinate (TOPROL-XL) 25 mg 24 hr tablet Take 1 tablet (25 mg total) by mouth daily (Patient not taking: Reported on 1/13/2025) 30 tablet 6   • [DISCONTINUED] norethindrone (MICRONOR) 0.35 MG tablet TAKE 1 TABLET BY MOUTH ONCE DAILY AT  THE  SAME  TIME  EVERY  DAY  AVOID  MISSING  PILLS (Patient not taking: Reported on 1/21/2025) 84 tablet 0   • [DISCONTINUED] Omega-3 Fatty Acids (fish oil) 1,000 mg Take 2 capsules (2,000 mg total) by mouth 2 (two) times a day (Patient not taking: Reported on 1/21/2025) 180 capsule 4   • [DISCONTINUED] rosuvastatin (CRESTOR) 10 MG tablet Take 1 tablet (10 mg total) by mouth daily (Patient not taking: Reported on 1/21/2025) 30 tablet 5     No current facility-administered medications on file prior to visit.        Objective   /78   Pulse 82   Temp (!) 96.8 °F (36 °C)   Resp 18   Ht 5' 2\" (1.575 m)   Wt 57.7 kg (127 lb 3.2 oz)   LMP 12/24/2024 (Approximate)   BMI 23.27 kg/m²   Vision Screening    Right eye Left eye Both eyes   Without correction 20/30 20/30 20/30   With correction         Physical Exam  Vitals and nursing note reviewed.   Constitutional:       General: She is not in acute distress.     Appearance: She is well-developed.   HENT:      Head: Normocephalic and atraumatic.      Salivary Glands: Right salivary gland is not diffusely enlarged or tender. Left salivary gland is not diffusely enlarged or tender.      Right Ear: Tympanic membrane, ear canal and external ear normal.      Left Ear: Tympanic membrane, ear canal and external ear normal.      Nose: Nose normal. No nasal tenderness or " mucosal edema.      Right Sinus: No maxillary sinus tenderness or frontal sinus tenderness.      Left Sinus: No maxillary sinus tenderness or frontal sinus tenderness.      Mouth/Throat:      Lips: Pink.      Mouth: Mucous membranes are moist.      Dentition: Normal dentition. No dental tenderness.      Tongue: No lesions.      Pharynx: Oropharynx is clear. No pharyngeal swelling, oropharyngeal exudate, posterior oropharyngeal erythema or uvula swelling.   Eyes:      General: Lids are normal.         Right eye: No discharge or hordeolum.         Left eye: No discharge or hordeolum.      Conjunctiva/sclera: Conjunctivae normal.      Right eye: Right conjunctiva is not injected.      Left eye: Left conjunctiva is not injected.   Neck:      Thyroid: No thyromegaly or thyroid tenderness.   Cardiovascular:      Rate and Rhythm: Normal rate and regular rhythm.      Pulses: Normal pulses.      Heart sounds: Normal heart sounds, S1 normal and S2 normal. No murmur heard.  Pulmonary:      Effort: Pulmonary effort is normal. No respiratory distress.      Breath sounds: Normal breath sounds.   Chest:      Chest wall: No mass, lacerations, deformity, swelling, tenderness, crepitus or edema. There is no dullness to percussion.   Abdominal:      General: Abdomen is flat. Bowel sounds are normal.      Palpations: Abdomen is soft.      Tenderness: There is no abdominal tenderness.      Hernia: No hernia is present. There is no hernia in the umbilical area or ventral area.   Genitourinary:     Comments:  and breast exam deferred as follows with gynecologist  Musculoskeletal:         General: No swelling or tenderness. Normal range of motion.      Cervical back: Neck supple.      Right lower leg: No edema.      Left lower leg: No edema.   Lymphadenopathy:      Cervical:      Right cervical: No superficial or posterior cervical adenopathy.     Left cervical: No superficial or posterior cervical adenopathy.      Upper Body:      Right  upper body: No supraclavicular or axillary adenopathy.      Left upper body: No supraclavicular or axillary adenopathy.   Skin:     General: Skin is warm and dry.   Neurological:      General: No focal deficit present.      Mental Status: She is alert and oriented to person, place, and time.      GCS: GCS eye subscore is 4. GCS verbal subscore is 5. GCS motor subscore is 6.      Sensory: Sensation is intact.      Motor: Motor function is intact.      Coordination: Coordination is intact.      Gait: Gait is intact.      Deep Tendon Reflexes: Reflexes are normal and symmetric.   Psychiatric:         Attention and Perception: Attention normal.         Mood and Affect: Mood normal.         Speech: Speech normal.         Behavior: Behavior normal. Behavior is cooperative.         Thought Content: Thought content normal.         Cognition and Memory: Cognition normal.         Judgment: Judgment normal.

## 2025-01-23 NOTE — TELEPHONE ENCOUNTER
PA for ROSUVASTATIN 40 MG  CANCELLED due to     [x]PRIOR AUTH WAS CANCELLED WHEN MEDICATION WAS CHANGED   []Medication already on Formulary  []Brand Name Preferred  []Patient no longer covered by insurance    Patient advised by     []My Chart Message  []Phone call    Message sent to office clinical pool   No      Scanned into Media  no